# Patient Record
Sex: FEMALE | Race: WHITE | Employment: FULL TIME | URBAN - METROPOLITAN AREA
[De-identification: names, ages, dates, MRNs, and addresses within clinical notes are randomized per-mention and may not be internally consistent; named-entity substitution may affect disease eponyms.]

---

## 2017-06-05 ENCOUNTER — APPOINTMENT (OUTPATIENT)
Dept: GENERAL RADIOLOGY | Facility: HOSPITAL | Age: 37
End: 2017-06-05
Payer: MEDICAID

## 2017-06-05 ENCOUNTER — HOSPITAL ENCOUNTER (EMERGENCY)
Facility: HOSPITAL | Age: 37
Discharge: HOME | End: 2017-06-05
Payer: MEDICAID

## 2017-06-05 VITALS
WEIGHT: 222 LBS | TEMPERATURE: 99 F | HEIGHT: 65 IN | HEART RATE: 88 BPM | OXYGEN SATURATION: 99 % | SYSTOLIC BLOOD PRESSURE: 126 MMHG | RESPIRATION RATE: 16 BRPM | DIASTOLIC BLOOD PRESSURE: 78 MMHG | BODY MASS INDEX: 36.99 KG/M2

## 2017-06-05 DIAGNOSIS — J45.901 ASTHMA EXACERBATION, MILD: ICD-10-CM

## 2017-06-05 DIAGNOSIS — J06.9 VIRAL UPPER RESPIRATORY TRACT INFECTION: Primary | ICD-10-CM

## 2017-06-05 PROCEDURE — 99284 EMERGENCY DEPT VISIT MOD MDM: CPT

## 2017-06-05 PROCEDURE — 71020 XR CHEST PA + LAT CHEST (CPT=71020): CPT

## 2017-06-05 PROCEDURE — 94640 AIRWAY INHALATION TREATMENT: CPT

## 2017-06-05 PROCEDURE — 99283 EMERGENCY DEPT VISIT LOW MDM: CPT

## 2017-06-05 RX ORDER — IPRATROPIUM BROMIDE AND ALBUTEROL SULFATE 2.5; .5 MG/3ML; MG/3ML
3 SOLUTION RESPIRATORY (INHALATION) ONCE
Status: COMPLETED | OUTPATIENT
Start: 2017-06-05 | End: 2017-06-05

## 2017-06-05 RX ORDER — CODEINE PHOSPHATE AND GUAIFENESIN 10; 100 MG/5ML; MG/5ML
5 SOLUTION ORAL EVERY 6 HOURS PRN
Qty: 120 ML | Refills: 0 | Status: SHIPPED | OUTPATIENT
Start: 2017-06-05 | End: 2017-09-01 | Stop reason: ALTCHOICE

## 2017-06-05 RX ORDER — ALBUTEROL SULFATE 2.5 MG/3ML
2.5 SOLUTION RESPIRATORY (INHALATION) EVERY 4 HOURS PRN
Qty: 25 AMPULE | Refills: 0 | Status: SHIPPED | OUTPATIENT
Start: 2017-06-05 | End: 2018-08-21 | Stop reason: ALTCHOICE

## 2017-06-05 RX ORDER — ALBUTEROL SULFATE 90 UG/1
2 AEROSOL, METERED RESPIRATORY (INHALATION) EVERY 4 HOURS PRN
Qty: 1 INHALER | Refills: 0 | Status: SHIPPED | OUTPATIENT
Start: 2017-06-05 | End: 2017-07-05

## 2017-06-05 NOTE — ED PROVIDER NOTES
Patient Seen in: [facility] Emergency Department    History   Patient presents with:  Cough/URI    Stated Complaint: cough    HPI    44-year-old female complaining of coughing the patient's states she has had coughing last 2 3 days has been a dry co Types: Cigarettes    Smokeless Status: Never Used                        Alcohol Use: No                 Comment: socially      Review of Systems    Positive for stated complaint: cough  Other systems are as noted in HPI.   Constitutional and vital 6 (six) hours as needed for cough., Normal, Disp-120 mL, R-0    albuterol sulfate (2.5 MG/3ML) 0.083% Inhalation Nebu Soln  Take 3 mL (2.5 mg total) by nebulization every 4 (four) hours as needed for Wheezing., Normal, Disp-25 ampule, R-0    !!  Albuterol S

## 2017-06-05 NOTE — ED INITIAL ASSESSMENT (HPI)
Pt sts that she has had RUSSELL since last noc with green productive cough x 2 days. Pt sts that she has pain in chest when she coughs. Pt sts \"it feels like the last time I had pneumonia\".  Pt has rash over chest and shoulders and sts that \"I'm allergic to

## 2017-07-08 ENCOUNTER — APPOINTMENT (OUTPATIENT)
Dept: GENERAL RADIOLOGY | Facility: HOSPITAL | Age: 37
End: 2017-07-08
Attending: EMERGENCY MEDICINE
Payer: MEDICAID

## 2017-07-08 ENCOUNTER — HOSPITAL ENCOUNTER (EMERGENCY)
Facility: HOSPITAL | Age: 37
Discharge: HOME | End: 2017-07-08
Attending: EMERGENCY MEDICINE
Payer: MEDICAID

## 2017-07-08 VITALS
RESPIRATION RATE: 16 BRPM | HEART RATE: 79 BPM | OXYGEN SATURATION: 100 % | BODY MASS INDEX: 35.16 KG/M2 | WEIGHT: 211 LBS | SYSTOLIC BLOOD PRESSURE: 147 MMHG | TEMPERATURE: 98 F | DIASTOLIC BLOOD PRESSURE: 106 MMHG | HEIGHT: 65 IN

## 2017-07-08 DIAGNOSIS — L03.116 CELLULITIS OF LEFT LOWER EXTREMITY: Primary | ICD-10-CM

## 2017-07-08 PROCEDURE — 73562 X-RAY EXAM OF KNEE 3: CPT | Performed by: EMERGENCY MEDICINE

## 2017-07-08 PROCEDURE — 99283 EMERGENCY DEPT VISIT LOW MDM: CPT

## 2017-07-08 RX ORDER — CLINDAMYCIN HYDROCHLORIDE 300 MG/1
300 CAPSULE ORAL 3 TIMES DAILY
Qty: 30 CAPSULE | Refills: 0 | Status: SHIPPED | OUTPATIENT
Start: 2017-07-08 | End: 2017-07-18

## 2017-07-09 NOTE — ED PROVIDER NOTES
Patient Seen in: [facility] Emergency Department    History   Patient presents with:  Lower Extremity Injury (musculoskeletal)    Stated Complaint: lt knee pain no trauma    HPI    73-year-old female presents for evaluation of left knee pain.   Iron Years: 0.00         Types: Cigarettes  Smokeless tobacco: Never Used                      Alcohol use: No               Comment: socially      Review of Systems    Positive for stated complaint: lt knee pain no trauma  Other systems are as noted in HPI.   C [id number]  [phone]    Call in 2 days  For wound re-check      Medications Prescribed:  Current Discharge Medication List    START taking these medications    Clindamycin HCl 300 MG Oral Cap  Take 1 capsule (300 mg total) by mouth 3 (three) times praveen

## 2017-08-29 ENCOUNTER — HOSPITAL ENCOUNTER (EMERGENCY)
Facility: HOSPITAL | Age: 37
Discharge: HOME | End: 2017-08-29
Attending: EMERGENCY MEDICINE
Payer: MEDICAID

## 2017-08-29 ENCOUNTER — APPOINTMENT (OUTPATIENT)
Dept: CT IMAGING | Facility: HOSPITAL | Age: 37
End: 2017-08-29
Attending: EMERGENCY MEDICINE
Payer: MEDICAID

## 2017-08-29 VITALS
RESPIRATION RATE: 18 BRPM | DIASTOLIC BLOOD PRESSURE: 72 MMHG | HEART RATE: 62 BPM | WEIGHT: 215 LBS | TEMPERATURE: 97 F | OXYGEN SATURATION: 99 % | HEIGHT: 65 IN | SYSTOLIC BLOOD PRESSURE: 138 MMHG | BODY MASS INDEX: 35.82 KG/M2

## 2017-08-29 DIAGNOSIS — R10.9 ACUTE RIGHT FLANK PAIN: Primary | ICD-10-CM

## 2017-08-29 LAB
ALBUMIN SERPL-MCNC: 3.9 G/DL (ref 3.5–4.8)
ALP LIVER SERPL-CCNC: 67 U/L (ref 37–98)
ALT SERPL-CCNC: 32 U/L (ref 14–54)
AST SERPL-CCNC: 13 U/L (ref 15–41)
BASOPHILS # BLD AUTO: 0.03 X10(3) UL (ref 0–0.1)
BASOPHILS NFR BLD AUTO: 0.4 %
BILIRUB SERPL-MCNC: 0.2 MG/DL (ref 0.1–2)
BILIRUB UR QL STRIP.AUTO: NEGATIVE
BUN BLD-MCNC: 7 MG/DL (ref 8–20)
CALCIUM BLD-MCNC: 9.2 MG/DL (ref 8.3–10.3)
CHLORIDE: 106 MMOL/L (ref 101–111)
CLARITY UR REFRACT.AUTO: CLEAR
CO2: 26 MMOL/L (ref 22–32)
COLOR UR AUTO: YELLOW
CREAT BLD-MCNC: 0.65 MG/DL (ref 0.55–1.02)
EOSINOPHIL # BLD AUTO: 0.17 X10(3) UL (ref 0–0.3)
EOSINOPHIL NFR BLD AUTO: 2 %
ERYTHROCYTE [DISTWIDTH] IN BLOOD BY AUTOMATED COUNT: 12.5 % (ref 11.5–16)
GLUCOSE BLD-MCNC: 78 MG/DL (ref 70–99)
GLUCOSE UR STRIP.AUTO-MCNC: NEGATIVE MG/DL
HCT VFR BLD AUTO: 40 % (ref 34–50)
HGB BLD-MCNC: 13.7 G/DL (ref 12–16)
IMMATURE GRANULOCYTE COUNT: 0.04 X10(3) UL (ref 0–1)
IMMATURE GRANULOCYTE RATIO %: 0.5 %
KETONES UR STRIP.AUTO-MCNC: NEGATIVE MG/DL
LIPASE: 104 U/L (ref 73–393)
LYMPHOCYTES # BLD AUTO: 3.14 X10(3) UL (ref 0.9–4)
LYMPHOCYTES NFR BLD AUTO: 37.5 %
M PROTEIN MFR SERPL ELPH: 7.1 G/DL (ref 6.1–8.3)
MCH RBC QN AUTO: 29.6 PG (ref 27–33.2)
MCHC RBC AUTO-ENTMCNC: 34.3 G/DL (ref 31–37)
MCV RBC AUTO: 86.4 FL (ref 81–100)
MONOCYTES # BLD AUTO: 0.58 X10(3) UL (ref 0.1–0.6)
MONOCYTES NFR BLD AUTO: 6.9 %
NEUTROPHIL ABS PRELIM: 4.41 X10 (3) UL (ref 1.3–6.7)
NEUTROPHILS # BLD AUTO: 4.41 X10(3) UL (ref 1.3–6.7)
NEUTROPHILS NFR BLD AUTO: 52.7 %
NITRITE UR QL STRIP.AUTO: NEGATIVE
PH UR STRIP.AUTO: 6 [PH] (ref 4.5–8)
PLATELET # BLD AUTO: 320 10(3)UL (ref 150–450)
POCT URINE PREGNANCY: NEGATIVE
POTASSIUM SERPL-SCNC: 3.9 MMOL/L (ref 3.6–5.1)
PROCEDURE CONTROL: YES
PROT UR STRIP.AUTO-MCNC: NEGATIVE MG/DL
RBC # BLD AUTO: 4.63 X10(6)UL (ref 3.8–5.1)
RBC UR QL AUTO: NEGATIVE
RED CELL DISTRIBUTION WIDTH-SD: 39 FL (ref 35.1–46.3)
SODIUM SERPL-SCNC: 138 MMOL/L (ref 136–144)
SP GR UR STRIP.AUTO: 1.01 (ref 1–1.03)
UROBILINOGEN UR STRIP.AUTO-MCNC: <2 MG/DL
WBC # BLD AUTO: 8.4 X10(3) UL (ref 4–13)

## 2017-08-29 PROCEDURE — 74176 CT ABD & PELVIS W/O CONTRAST: CPT | Performed by: EMERGENCY MEDICINE

## 2017-08-29 PROCEDURE — 96361 HYDRATE IV INFUSION ADD-ON: CPT

## 2017-08-29 PROCEDURE — 80053 COMPREHEN METABOLIC PANEL: CPT | Performed by: EMERGENCY MEDICINE

## 2017-08-29 PROCEDURE — 96375 TX/PRO/DX INJ NEW DRUG ADDON: CPT

## 2017-08-29 PROCEDURE — 83690 ASSAY OF LIPASE: CPT | Performed by: EMERGENCY MEDICINE

## 2017-08-29 PROCEDURE — 96374 THER/PROPH/DIAG INJ IV PUSH: CPT

## 2017-08-29 PROCEDURE — 87086 URINE CULTURE/COLONY COUNT: CPT | Performed by: EMERGENCY MEDICINE

## 2017-08-29 PROCEDURE — 99284 EMERGENCY DEPT VISIT MOD MDM: CPT

## 2017-08-29 PROCEDURE — 85025 COMPLETE CBC W/AUTO DIFF WBC: CPT | Performed by: EMERGENCY MEDICINE

## 2017-08-29 PROCEDURE — 81001 URINALYSIS AUTO W/SCOPE: CPT | Performed by: EMERGENCY MEDICINE

## 2017-08-29 PROCEDURE — 81025 URINE PREGNANCY TEST: CPT

## 2017-08-29 RX ORDER — SULFAMETHOXAZOLE AND TRIMETHOPRIM 800; 160 MG/1; MG/1
1 TABLET ORAL 2 TIMES DAILY
Qty: 20 TABLET | Refills: 0 | Status: SHIPPED | OUTPATIENT
Start: 2017-08-29 | End: 2017-09-08

## 2017-08-29 RX ORDER — ONDANSETRON 2 MG/ML
4 INJECTION INTRAMUSCULAR; INTRAVENOUS ONCE
Status: COMPLETED | OUTPATIENT
Start: 2017-08-29 | End: 2017-08-29

## 2017-08-29 RX ORDER — HYDROCODONE BITARTRATE AND ACETAMINOPHEN 5; 325 MG/1; MG/1
1-2 TABLET ORAL EVERY 4 HOURS PRN
Qty: 20 TABLET | Refills: 0 | Status: SHIPPED | OUTPATIENT
Start: 2017-08-29 | End: 2017-09-05

## 2017-08-29 RX ORDER — ONDANSETRON 4 MG/1
4 TABLET, ORALLY DISINTEGRATING ORAL EVERY 4 HOURS PRN
Qty: 10 TABLET | Refills: 0 | Status: SHIPPED | OUTPATIENT
Start: 2017-08-29 | End: 2017-09-05

## 2017-08-29 RX ORDER — KETOROLAC TROMETHAMINE 30 MG/ML
30 INJECTION, SOLUTION INTRAMUSCULAR; INTRAVENOUS ONCE
Status: COMPLETED | OUTPATIENT
Start: 2017-08-29 | End: 2017-08-29

## 2017-08-29 RX ORDER — HYDROMORPHONE HYDROCHLORIDE 1 MG/ML
0.5 INJECTION, SOLUTION INTRAMUSCULAR; INTRAVENOUS; SUBCUTANEOUS EVERY 30 MIN PRN
Status: DISCONTINUED | OUTPATIENT
Start: 2017-08-29 | End: 2017-08-30

## 2017-08-30 NOTE — ED PROVIDER NOTES
Patient Seen in: [facility] Emergency Department    History   Patient presents with:  Urinary Symptoms (urologic)  Abdomen/Flank Pain (GI/)    Stated Complaint: UTI SX AND FLANK PAIN    HPI    Patient is a 80-year-old female who states that she wo Albuterol Sulfate  (90 BASE) MCG/ACT Inhalation Aero Soln,  Inhale into the lungs every 6 (six) hours as needed for Wheezing. ibuprofen (MOTRIN) 800 MG Oral Tab,  Take 800 mg by mouth every 6 (six) hours as needed for Pain.    ClonazePAM (KLONOPIN) sounds, soft, mild tenderness right flank, nondistended. No rebound, no guarding, no hepatosplenomegaly. EXTREMITIES: Full range of motion, no tenderness, good capillary refill. SKIN: No rash, good turgor.   NEURO: Patient answers questions appropriately with Bactrim. Patient will comfortable going home. Patient was given pain medicines for home. Recommend plenty of fluids. Follow-up for further evaluation return if new or worse symptoms.         Disposition and Plan     Clinical Impression:  Acute righ

## 2017-08-30 NOTE — ED INITIAL ASSESSMENT (HPI)
Bilateral flank pain with radiation to bilateral groin x1 day.  Pt reports frequency of urination, denies burning

## 2017-09-01 ENCOUNTER — HOSPITAL ENCOUNTER (EMERGENCY)
Facility: HOSPITAL | Age: 37
Discharge: HOME | End: 2017-09-01
Attending: EMERGENCY MEDICINE
Payer: MEDICAID

## 2017-09-01 ENCOUNTER — APPOINTMENT (OUTPATIENT)
Dept: ULTRASOUND IMAGING | Facility: HOSPITAL | Age: 37
End: 2017-09-01
Attending: EMERGENCY MEDICINE
Payer: MEDICAID

## 2017-09-01 VITALS
WEIGHT: 204 LBS | DIASTOLIC BLOOD PRESSURE: 58 MMHG | HEART RATE: 60 BPM | BODY MASS INDEX: 33.99 KG/M2 | TEMPERATURE: 98 F | HEIGHT: 65 IN | RESPIRATION RATE: 16 BRPM | SYSTOLIC BLOOD PRESSURE: 114 MMHG | OXYGEN SATURATION: 100 %

## 2017-09-01 DIAGNOSIS — R10.9 ABDOMINAL PAIN OF UNKNOWN ETIOLOGY: Primary | ICD-10-CM

## 2017-09-01 LAB
ALBUMIN SERPL-MCNC: 4 G/DL (ref 3.5–4.8)
ALP LIVER SERPL-CCNC: 61 U/L (ref 37–98)
ALT SERPL-CCNC: 31 U/L (ref 14–54)
AST SERPL-CCNC: 16 U/L (ref 15–41)
BASOPHILS # BLD AUTO: 0.02 X10(3) UL (ref 0–0.1)
BASOPHILS NFR BLD AUTO: 0.5 %
BILIRUB SERPL-MCNC: 0.4 MG/DL (ref 0.1–2)
BILIRUB UR QL STRIP.AUTO: NEGATIVE
BUN BLD-MCNC: 8 MG/DL (ref 8–20)
CALCIUM BLD-MCNC: 9.3 MG/DL (ref 8.3–10.3)
CHLORIDE: 108 MMOL/L (ref 101–111)
CO2: 21 MMOL/L (ref 22–32)
COLOR UR AUTO: YELLOW
CREAT BLD-MCNC: 0.72 MG/DL (ref 0.55–1.02)
EOSINOPHIL # BLD AUTO: 0.11 X10(3) UL (ref 0–0.3)
EOSINOPHIL NFR BLD AUTO: 2.6 %
ERYTHROCYTE [DISTWIDTH] IN BLOOD BY AUTOMATED COUNT: 12.3 % (ref 11.5–16)
GLUCOSE BLD-MCNC: 80 MG/DL (ref 70–99)
GLUCOSE UR STRIP.AUTO-MCNC: NEGATIVE MG/DL
HCT VFR BLD AUTO: 41.4 % (ref 34–50)
HGB BLD-MCNC: 14.1 G/DL (ref 12–16)
IMMATURE GRANULOCYTE COUNT: 0.02 X10(3) UL (ref 0–1)
IMMATURE GRANULOCYTE RATIO %: 0.5 %
KETONES UR STRIP.AUTO-MCNC: 20 MG/DL
LIPASE: 64 U/L (ref 73–393)
LYMPHOCYTES # BLD AUTO: 0.58 X10(3) UL (ref 0.9–4)
LYMPHOCYTES NFR BLD AUTO: 13.5 %
M PROTEIN MFR SERPL ELPH: 7.4 G/DL (ref 6.1–8.3)
MCH RBC QN AUTO: 29.2 PG (ref 27–33.2)
MCHC RBC AUTO-ENTMCNC: 34.1 G/DL (ref 31–37)
MCV RBC AUTO: 85.7 FL (ref 81–100)
MONOCYTES # BLD AUTO: 0.42 X10(3) UL (ref 0.1–0.6)
MONOCYTES NFR BLD AUTO: 9.8 %
NEUTROPHIL ABS PRELIM: 3.14 X10 (3) UL (ref 1.3–6.7)
NEUTROPHILS # BLD AUTO: 3.14 X10(3) UL (ref 1.3–6.7)
NEUTROPHILS NFR BLD AUTO: 73.1 %
NITRITE UR QL STRIP.AUTO: NEGATIVE
PH UR STRIP.AUTO: 6 [PH] (ref 4.5–8)
PLATELET # BLD AUTO: 269 10(3)UL (ref 150–450)
POTASSIUM SERPL-SCNC: 4 MMOL/L (ref 3.6–5.1)
PROT UR STRIP.AUTO-MCNC: NEGATIVE MG/DL
RBC # BLD AUTO: 4.83 X10(6)UL (ref 3.8–5.1)
RBC UR QL AUTO: NEGATIVE
RED CELL DISTRIBUTION WIDTH-SD: 38.5 FL (ref 35.1–46.3)
SODIUM SERPL-SCNC: 139 MMOL/L (ref 136–144)
SP GR UR STRIP.AUTO: 1.02 (ref 1–1.03)
UROBILINOGEN UR STRIP.AUTO-MCNC: 2 MG/DL
WBC # BLD AUTO: 4.3 X10(3) UL (ref 4–13)

## 2017-09-01 PROCEDURE — 76856 US EXAM PELVIC COMPLETE: CPT | Performed by: EMERGENCY MEDICINE

## 2017-09-01 PROCEDURE — 99284 EMERGENCY DEPT VISIT MOD MDM: CPT

## 2017-09-01 PROCEDURE — 80053 COMPREHEN METABOLIC PANEL: CPT | Performed by: EMERGENCY MEDICINE

## 2017-09-01 PROCEDURE — 81001 URINALYSIS AUTO W/SCOPE: CPT | Performed by: EMERGENCY MEDICINE

## 2017-09-01 PROCEDURE — 93975 VASCULAR STUDY: CPT | Performed by: EMERGENCY MEDICINE

## 2017-09-01 PROCEDURE — 96374 THER/PROPH/DIAG INJ IV PUSH: CPT

## 2017-09-01 PROCEDURE — 83690 ASSAY OF LIPASE: CPT | Performed by: EMERGENCY MEDICINE

## 2017-09-01 PROCEDURE — 96361 HYDRATE IV INFUSION ADD-ON: CPT

## 2017-09-01 PROCEDURE — 96375 TX/PRO/DX INJ NEW DRUG ADDON: CPT

## 2017-09-01 PROCEDURE — 85025 COMPLETE CBC W/AUTO DIFF WBC: CPT | Performed by: EMERGENCY MEDICINE

## 2017-09-01 PROCEDURE — 76830 TRANSVAGINAL US NON-OB: CPT | Performed by: EMERGENCY MEDICINE

## 2017-09-01 RX ORDER — HYDROCODONE BITARTRATE AND ACETAMINOPHEN 5; 325 MG/1; MG/1
1 TABLET ORAL EVERY 4 HOURS PRN
Qty: 10 TABLET | Refills: 0 | Status: SHIPPED | OUTPATIENT
Start: 2017-09-01 | End: 2017-09-03

## 2017-09-01 RX ORDER — DIPHENHYDRAMINE HYDROCHLORIDE 50 MG/ML
25 INJECTION INTRAMUSCULAR; INTRAVENOUS ONCE
Status: COMPLETED | OUTPATIENT
Start: 2017-09-01 | End: 2017-09-01

## 2017-09-01 RX ORDER — METOCLOPRAMIDE HYDROCHLORIDE 5 MG/ML
10 INJECTION INTRAMUSCULAR; INTRAVENOUS ONCE
Status: COMPLETED | OUTPATIENT
Start: 2017-09-01 | End: 2017-09-01

## 2017-09-01 RX ORDER — KETOROLAC TROMETHAMINE 30 MG/ML
30 INJECTION, SOLUTION INTRAMUSCULAR; INTRAVENOUS ONCE
Status: COMPLETED | OUTPATIENT
Start: 2017-09-01 | End: 2017-09-01

## 2017-09-01 NOTE — ED NOTES
Pt returned from [place]. Pt reports pain increasing in head and R flank. Denies nausea. IVF complete. VSS.  A&A, will continue to monitor

## 2017-09-01 NOTE — ED NOTES
Rounding complete. Pt reports improvement in pain after medication. Pt reports head ache 4/10 and flank pain a 6/10. IVF running. Warm blankets provided, call light in reach. Family remains at bedside.  Will continue to monitor

## 2017-09-01 NOTE — ED INITIAL ASSESSMENT (HPI)
Pt arrives with c/o head ache and flank pain. Pt was seen here Tuesday with kidney infection and sent home on antibiotics. Pt denies any relief of pain or improvement with antibiotics. Pt also reports HA with chills. Denies fevers. +Nausea/ vomiting.  Pt de

## 2017-09-01 NOTE — ED NOTES
Patient being discharged home  Patient demonstrates improvement upon d/c. Pt verbalized understanding of d/c instructions. AVS provided.    Home with mother

## 2017-09-01 NOTE — ED PROVIDER NOTES
Patient Seen in: [facility] Emergency Department    History   Patient presents with:  Headache (neurologic)  Abdomen/Flank Pain (GI/)    Stated Complaint: headache, flank pain,     HPI    52-year-old female complaining of abdominal pain the patien Sulfamethoxazole-TMP -160 MG Oral Tab per tablet,  Take 1 tablet by mouth 2 (two) times daily. albuterol sulfate (2.5 MG/3ML) 0.083% Inhalation Nebu Soln,  Take 3 mL (2.5 mg total) by nebulization every 4 (four) hours as needed for Wheezing.    Albu auscultation. Cardiovascular exam regular rate and rhythm without murmurs. Abdomen is soft and moderate right lower quadrant tenderness there is mild left lower quadrant tenderness and mild right flank tenderness there is no rebound or guarding.   Without GOLD   RAINBOW DRAW LAVENDER   RAINBOW DRAW LIGHT GREEN       ============================================================  ED Course  ------------------------------------------------------------  MDM   IV was started labs are drawn the patient's labs were

## 2018-02-22 ENCOUNTER — HOSPITAL ENCOUNTER (EMERGENCY)
Age: 38
Discharge: HOME | End: 2018-02-22
Attending: EMERGENCY MEDICINE
Payer: MEDICAID

## 2018-02-22 ENCOUNTER — APPOINTMENT (OUTPATIENT)
Dept: GENERAL RADIOLOGY | Age: 38
End: 2018-02-22
Attending: EMERGENCY MEDICINE
Payer: MEDICAID

## 2018-02-22 VITALS
SYSTOLIC BLOOD PRESSURE: 113 MMHG | HEIGHT: 65 IN | TEMPERATURE: 98 F | RESPIRATION RATE: 19 BRPM | OXYGEN SATURATION: 99 % | HEART RATE: 78 BPM | WEIGHT: 195 LBS | BODY MASS INDEX: 32.49 KG/M2 | DIASTOLIC BLOOD PRESSURE: 58 MMHG

## 2018-02-22 DIAGNOSIS — J45.21 MILD INTERMITTENT ASTHMA WITH EXACERBATION: ICD-10-CM

## 2018-02-22 DIAGNOSIS — J11.1 INFLUENZA: Primary | ICD-10-CM

## 2018-02-22 LAB
ATRIAL RATE: 91 BPM
P AXIS: 59 DEGREES
P-R INTERVAL: 160 MS
Q-T INTERVAL: 384 MS
QRS DURATION: 88 MS
QTC CALCULATION (BEZET): 472 MS
R AXIS: 77 DEGREES
T AXIS: 63 DEGREES
VENTRICULAR RATE: 91 BPM

## 2018-02-22 PROCEDURE — 99284 EMERGENCY DEPT VISIT MOD MDM: CPT

## 2018-02-22 PROCEDURE — 94640 AIRWAY INHALATION TREATMENT: CPT

## 2018-02-22 PROCEDURE — 93005 ELECTROCARDIOGRAM TRACING: CPT

## 2018-02-22 PROCEDURE — 94644 CONT INHLJ TX 1ST HOUR: CPT

## 2018-02-22 PROCEDURE — 71046 X-RAY EXAM CHEST 2 VIEWS: CPT | Performed by: EMERGENCY MEDICINE

## 2018-02-22 PROCEDURE — 93010 ELECTROCARDIOGRAM REPORT: CPT

## 2018-02-22 PROCEDURE — 99285 EMERGENCY DEPT VISIT HI MDM: CPT

## 2018-02-22 RX ORDER — ALBUTEROL SULFATE 90 UG/1
2 AEROSOL, METERED RESPIRATORY (INHALATION) EVERY 6 HOURS PRN
Qty: 1 INHALER | Refills: 0 | Status: SHIPPED | OUTPATIENT
Start: 2018-02-22 | End: 2018-08-21 | Stop reason: ALTCHOICE

## 2018-02-22 RX ORDER — PREDNISONE 20 MG/1
60 TABLET ORAL ONCE
Status: COMPLETED | OUTPATIENT
Start: 2018-02-22 | End: 2018-02-22

## 2018-02-22 RX ORDER — ALBUTEROL SULFATE 2.5 MG/3ML
2.5 SOLUTION RESPIRATORY (INHALATION) EVERY 4 HOURS PRN
Qty: 30 AMPULE | Refills: 12 | Status: SHIPPED | OUTPATIENT
Start: 2018-02-22 | End: 2018-08-21 | Stop reason: ALTCHOICE

## 2018-02-22 RX ORDER — PREDNISONE 20 MG/1
60 TABLET ORAL DAILY
Qty: 15 TABLET | Refills: 0 | Status: SHIPPED | OUTPATIENT
Start: 2018-02-22 | End: 2018-02-27

## 2018-02-22 RX ORDER — IPRATROPIUM BROMIDE AND ALBUTEROL SULFATE 2.5; .5 MG/3ML; MG/3ML
3 SOLUTION RESPIRATORY (INHALATION) ONCE
Status: COMPLETED | OUTPATIENT
Start: 2018-02-22 | End: 2018-02-22

## 2018-02-22 RX ORDER — OSELTAMIVIR PHOSPHATE 75 MG/1
75 CAPSULE ORAL 2 TIMES DAILY
Qty: 10 CAPSULE | Refills: 0 | Status: SHIPPED | OUTPATIENT
Start: 2018-02-22 | End: 2018-02-27

## 2018-02-22 NOTE — ED PROVIDER NOTES
Patient Seen in: [address] [facility] In [place]    History   Patient presents with:  Chest Pain Angina (cardiovascular)    Stated Complaint:     HPI    Patient is a 60-year-old female presents emergency room for evaluation of chest tightness a BMI 32.45 kg/m²         Physical Exam    GENERAL: No acute distress, well appearing and non-toxic, Alert and oriented X 3   HEENT: Normocephalic, atraumatic. Moist mucous membranes.   Pupils equal round reactive to light accommodation, extraocular motion i [id number]  ------------------------------------------------------------       MDM     Patient is a 78-year-old female comes emergency room for evaluation of chest tightness. Patient given DuoNeb here and prednisone.   Lung sounds reexamined with improved aerati (2.5 mg total) by nebulization every 4 (four) hours as needed for Wheezing. Qty: 30 ampule Refills: 12    predniSONE 20 MG Oral Tab  Take 3 tablets (60 mg total) by mouth daily.   Qty: 15 tablet Refills: 0    Oseltamivir Phosphate (TAMIFLU) 75 MG Oral Cap

## 2018-08-21 PROCEDURE — 88175 CYTOPATH C/V AUTO FLUID REDO: CPT | Performed by: OBSTETRICS & GYNECOLOGY

## 2018-08-21 PROCEDURE — 87624 HPV HI-RISK TYP POOLED RSLT: CPT | Performed by: OBSTETRICS & GYNECOLOGY

## 2018-08-29 ENCOUNTER — HOSPITAL ENCOUNTER (EMERGENCY)
Age: 38
Discharge: HOME | End: 2018-08-29
Attending: EMERGENCY MEDICINE
Payer: MEDICAID

## 2018-08-29 ENCOUNTER — APPOINTMENT (OUTPATIENT)
Dept: GENERAL RADIOLOGY | Age: 38
End: 2018-08-29
Attending: EMERGENCY MEDICINE
Payer: MEDICAID

## 2018-08-29 VITALS
SYSTOLIC BLOOD PRESSURE: 115 MMHG | WEIGHT: 200 LBS | OXYGEN SATURATION: 100 % | HEIGHT: 65 IN | TEMPERATURE: 99 F | BODY MASS INDEX: 33.32 KG/M2 | RESPIRATION RATE: 18 BRPM | DIASTOLIC BLOOD PRESSURE: 77 MMHG | HEART RATE: 79 BPM

## 2018-08-29 DIAGNOSIS — J40 BRONCHITIS: Primary | ICD-10-CM

## 2018-08-29 PROCEDURE — 99283 EMERGENCY DEPT VISIT LOW MDM: CPT

## 2018-08-29 PROCEDURE — 71046 X-RAY EXAM CHEST 2 VIEWS: CPT | Performed by: EMERGENCY MEDICINE

## 2018-08-29 RX ORDER — CLARITHROMYCIN 500 MG/1
500 TABLET, COATED ORAL 2 TIMES DAILY
Qty: 20 TABLET | Refills: 0 | Status: SHIPPED | OUTPATIENT
Start: 2018-08-29 | End: 2018-09-08

## 2018-08-29 RX ORDER — ALBUTEROL SULFATE 90 UG/1
2 AEROSOL, METERED RESPIRATORY (INHALATION) EVERY 6 HOURS PRN
COMMUNITY
End: 2020-03-04

## 2018-08-29 NOTE — ED PROVIDER NOTES
Patient Seen in: THE [facility] Emergency Department In [place]    History   Patient presents with:  Cough/URI  Dyspnea RUSSELL SOB (respiratory)    Stated Complaint: cough, sore throat, short of breath onset two days, fever    HPI    The patient is 27-year-old f air)    Current:/77   Pulse 79   Temp 98.5 °F (36.9 °C) (Temporal)   Resp 18   Ht 165.1 cm (5' 5\")   Wt 90.7 kg   LMP 08/18/2018   SpO2 100%   BMI 33.28 kg/m²         Physical Exam  GENERAL: Well-developed, well-nourished female sitting up breathing Patient will be treated with antibiotics at home and instructions to stop smoking. Patient instructed to encourage fluids and rest at home. Patient has been instructed to return to ER immediately if symptoms worsen or if any other problems arise.   Esmer

## 2018-08-29 NOTE — ED INITIAL ASSESSMENT (HPI)
States two day hx of cough, sore throat and thoracic back pain, worse with cough also feel short of breath and fatigued

## 2018-09-14 PROCEDURE — 88305 TISSUE EXAM BY PATHOLOGIST: CPT | Performed by: OBSTETRICS & GYNECOLOGY

## 2018-10-09 PROCEDURE — 88305 TISSUE EXAM BY PATHOLOGIST: CPT | Performed by: OBSTETRICS & GYNECOLOGY

## 2019-02-02 PROCEDURE — 99283 EMERGENCY DEPT VISIT LOW MDM: CPT

## 2019-02-02 PROCEDURE — 99284 EMERGENCY DEPT VISIT MOD MDM: CPT

## 2019-02-03 ENCOUNTER — HOSPITAL ENCOUNTER (EMERGENCY)
Facility: HOSPITAL | Age: 39
Discharge: HOME | End: 2019-02-03
Attending: EMERGENCY MEDICINE

## 2019-02-03 VITALS
HEART RATE: 67 BPM | WEIGHT: 210 LBS | TEMPERATURE: 97 F | DIASTOLIC BLOOD PRESSURE: 92 MMHG | OXYGEN SATURATION: 98 % | HEIGHT: 65 IN | BODY MASS INDEX: 34.99 KG/M2 | RESPIRATION RATE: 16 BRPM | SYSTOLIC BLOOD PRESSURE: 133 MMHG

## 2019-02-03 DIAGNOSIS — J40 COMPLICATED BRONCHITIS: Primary | ICD-10-CM

## 2019-02-03 PROCEDURE — 94640 AIRWAY INHALATION TREATMENT: CPT

## 2019-02-03 RX ORDER — ALBUTEROL SULFATE 90 UG/1
2 AEROSOL, METERED RESPIRATORY (INHALATION) EVERY 4 HOURS PRN
Qty: 1 INHALER | Refills: 0 | Status: SHIPPED | OUTPATIENT
Start: 2019-02-03 | End: 2019-03-05

## 2019-02-03 RX ORDER — PREDNISONE 20 MG/1
40 TABLET ORAL DAILY
Qty: 10 TABLET | Refills: 0 | Status: SHIPPED | OUTPATIENT
Start: 2019-02-03 | End: 2019-02-08

## 2019-02-03 RX ORDER — IPRATROPIUM BROMIDE AND ALBUTEROL SULFATE 2.5; .5 MG/3ML; MG/3ML
3 SOLUTION RESPIRATORY (INHALATION) ONCE
Status: COMPLETED | OUTPATIENT
Start: 2019-02-03 | End: 2019-02-03

## 2019-02-03 RX ORDER — AZITHROMYCIN 250 MG/1
500 TABLET, FILM COATED ORAL ONCE
Status: COMPLETED | OUTPATIENT
Start: 2019-02-03 | End: 2019-02-03

## 2019-02-03 RX ORDER — GUAIFENESIN 600 MG
1200 TABLET, EXTENDED RELEASE 12 HR ORAL 2 TIMES DAILY
Qty: 20 TABLET | Refills: 0 | Status: SHIPPED | OUTPATIENT
Start: 2019-02-03 | End: 2020-02-06 | Stop reason: ALTCHOICE

## 2019-02-03 NOTE — ED PROVIDER NOTES
Patient Seen in: [facility] Emergency Department    History   Patient presents with:  Dyspnea RUSSELL SOB (respiratory)  Cough/URI    Stated Complaint: RUSSELL    HPI     40 yo fm with asthma now presents to the ER with c/o cough and wheezing.  She is also c person, place, and time. She appears well-developed and well-nourished. HENT:   Head: Normocephalic and atraumatic. Eyes: EOM are normal. Pupils are equal, round, and reactive to light. Neck: Normal range of motion. Neck supple.    Cardiovascular: Nor tablets (1,200 mg total) by mouth 2 (two) times daily. , Normal, Disp-20 tablet, R-0    !! - Potential duplicate medications found. Please discuss with provider.

## 2019-02-03 NOTE — ED INITIAL ASSESSMENT (HPI)
Patient complaining of generalized body aches, congestion and cough x one week. States she started using her albuterol inhaler more often and ran out 2 days ago. States her kids had sinus infections for the past couple weeks.

## 2019-10-05 ENCOUNTER — APPOINTMENT (OUTPATIENT)
Dept: GENERAL RADIOLOGY | Facility: HOSPITAL | Age: 39
End: 2019-10-05
Attending: EMERGENCY MEDICINE
Payer: MEDICAID

## 2019-10-05 ENCOUNTER — HOSPITAL ENCOUNTER (EMERGENCY)
Facility: HOSPITAL | Age: 39
Discharge: HOME | End: 2019-10-05
Attending: EMERGENCY MEDICINE
Payer: MEDICAID

## 2019-10-05 VITALS
HEART RATE: 72 BPM | HEIGHT: 65 IN | DIASTOLIC BLOOD PRESSURE: 73 MMHG | SYSTOLIC BLOOD PRESSURE: 136 MMHG | TEMPERATURE: 98 F | OXYGEN SATURATION: 97 % | BODY MASS INDEX: 35.16 KG/M2 | RESPIRATION RATE: 11 BRPM | WEIGHT: 211 LBS

## 2019-10-05 DIAGNOSIS — B34.9 VIRAL SYNDROME: Primary | ICD-10-CM

## 2019-10-05 PROCEDURE — 96361 HYDRATE IV INFUSION ADD-ON: CPT

## 2019-10-05 PROCEDURE — 71046 X-RAY EXAM CHEST 2 VIEWS: CPT | Performed by: EMERGENCY MEDICINE

## 2019-10-05 PROCEDURE — 96374 THER/PROPH/DIAG INJ IV PUSH: CPT

## 2019-10-05 PROCEDURE — 84443 ASSAY THYROID STIM HORMONE: CPT | Performed by: EMERGENCY MEDICINE

## 2019-10-05 PROCEDURE — 87502 INFLUENZA DNA AMP PROBE: CPT | Performed by: EMERGENCY MEDICINE

## 2019-10-05 PROCEDURE — 80053 COMPREHEN METABOLIC PANEL: CPT | Performed by: EMERGENCY MEDICINE

## 2019-10-05 PROCEDURE — 85025 COMPLETE CBC W/AUTO DIFF WBC: CPT | Performed by: EMERGENCY MEDICINE

## 2019-10-05 PROCEDURE — 99284 EMERGENCY DEPT VISIT MOD MDM: CPT

## 2019-10-05 PROCEDURE — 87798 DETECT AGENT NOS DNA AMP: CPT | Performed by: EMERGENCY MEDICINE

## 2019-10-05 PROCEDURE — 81025 URINE PREGNANCY TEST: CPT

## 2019-10-05 PROCEDURE — 87999 UNLISTED MICROBIOLOGY PX: CPT

## 2019-10-05 RX ORDER — ACETAMINOPHEN 500 MG
1000 TABLET ORAL ONCE
Status: COMPLETED | OUTPATIENT
Start: 2019-10-05 | End: 2019-10-05

## 2019-10-05 RX ORDER — KETOROLAC TROMETHAMINE 30 MG/ML
15 INJECTION, SOLUTION INTRAMUSCULAR; INTRAVENOUS ONCE
Status: COMPLETED | OUTPATIENT
Start: 2019-10-05 | End: 2019-10-05

## 2019-10-05 NOTE — ED PROVIDER NOTES
Patient Seen in: [facility] Emergency Department      History   Patient presents with:  Fatigue (constitutional, neurologic)    Stated Complaint: achy    HPI    This is a 20-year-old female complaining of generalized achiness.   The patient states th alert and oriented x3 no acute distress HEENT exam the oropharynx is clear oral mucosa is moist eyes normal tympanic memories normal neck is supple no nuchal rigidity lymphadenopathy or JVD.   Lungs are clear to auscultation cardiovascular exam shows regula diagnosis)    Disposition:  Discharge  10/5/2019  4:11 pm    Follow-up:  MD [facility]   [address] [facility]  [phone]    In 2 days  As needed        Medications Prescribed:  Discharge Medication List as of 10

## 2019-10-05 NOTE — ED INITIAL ASSESSMENT (HPI)
C/o fatigue and generalized pain since Thursday. States \"everything hurts\". Reports she has been getting her period every 2 weeks and does not know if it's co-related. C/o feeling hot/cold, no known fever. Has had a productive cough.

## 2020-02-20 PROCEDURE — 88305 TISSUE EXAM BY PATHOLOGIST: CPT | Performed by: OBSTETRICS & GYNECOLOGY

## 2020-02-20 PROCEDURE — 88342 IMHCHEM/IMCYTCHM 1ST ANTB: CPT | Performed by: OBSTETRICS & GYNECOLOGY

## 2020-02-24 ENCOUNTER — OFFICE VISIT (OUTPATIENT)
Dept: FAMILY MEDICINE CLINIC | Facility: CLINIC | Age: 40
End: 2020-02-24
Payer: MEDICAID

## 2020-02-24 ENCOUNTER — HOSPITAL ENCOUNTER (OUTPATIENT)
Dept: GENERAL RADIOLOGY | Age: 40
Discharge: HOME | End: 2020-02-24
Attending: NURSE PRACTITIONER
Payer: MEDICAID

## 2020-02-24 VITALS
SYSTOLIC BLOOD PRESSURE: 144 MMHG | HEART RATE: 82 BPM | BODY MASS INDEX: 36.12 KG/M2 | RESPIRATION RATE: 14 BRPM | HEIGHT: 65 IN | OXYGEN SATURATION: 98 % | DIASTOLIC BLOOD PRESSURE: 88 MMHG | WEIGHT: 216.81 LBS | TEMPERATURE: 99 F

## 2020-02-24 DIAGNOSIS — J01.00 ACUTE MAXILLARY SINUSITIS, RECURRENCE NOT SPECIFIED: ICD-10-CM

## 2020-02-24 DIAGNOSIS — R68.89 FLU-LIKE SYMPTOMS: Primary | ICD-10-CM

## 2020-02-24 DIAGNOSIS — F17.200 TOBACCO USE DISORDER: ICD-10-CM

## 2020-02-24 DIAGNOSIS — J98.01 BRONCHOSPASM: ICD-10-CM

## 2020-02-24 DIAGNOSIS — R68.89 FLU-LIKE SYMPTOMS: ICD-10-CM

## 2020-02-24 LAB
OPERATOR ID: NORMAL
POCT INFLUENZA A: NEGATIVE
POCT INFLUENZA B: NEGATIVE

## 2020-02-24 PROCEDURE — 71046 X-RAY EXAM CHEST 2 VIEWS: CPT | Performed by: NURSE PRACTITIONER

## 2020-02-24 PROCEDURE — 87502 INFLUENZA DNA AMP PROBE: CPT | Performed by: NURSE PRACTITIONER

## 2020-02-24 PROCEDURE — 99203 OFFICE O/P NEW LOW 30 MIN: CPT | Performed by: NURSE PRACTITIONER

## 2020-02-24 PROCEDURE — 94640 AIRWAY INHALATION TREATMENT: CPT | Performed by: NURSE PRACTITIONER

## 2020-02-24 RX ORDER — AMOXICILLIN AND CLAVULANATE POTASSIUM 875; 125 MG/1; MG/1
1 TABLET, FILM COATED ORAL 2 TIMES DAILY
Qty: 20 TABLET | Refills: 0 | Status: SHIPPED | OUTPATIENT
Start: 2020-02-24 | End: 2020-03-05

## 2020-02-24 RX ORDER — ALBUTEROL SULFATE 90 UG/1
2 AEROSOL, METERED RESPIRATORY (INHALATION) EVERY 4 HOURS PRN
Qty: 1 INHALER | Refills: 0 | Status: SHIPPED | OUTPATIENT
Start: 2020-02-24 | End: 2020-09-21

## 2020-02-24 RX ORDER — IPRATROPIUM BROMIDE AND ALBUTEROL SULFATE 2.5; .5 MG/3ML; MG/3ML
3 SOLUTION RESPIRATORY (INHALATION) ONCE
Status: COMPLETED | OUTPATIENT
Start: 2020-02-24 | End: 2020-02-24

## 2020-02-24 RX ADMIN — IPRATROPIUM BROMIDE AND ALBUTEROL SULFATE 3 ML: 2.5; .5 SOLUTION RESPIRATORY (INHALATION) at 11:59:00

## 2020-02-24 NOTE — PROGRESS NOTES
CHIEF COMPLAINT:   Patient presents with:  Flu: couging up mucus, fatigue, headache, body ache, ear pain x last wednesday       HPI:   Tavo Salomon is a 46year old female who presents for upper respiratory symptoms for  5 days.  Patient reports HEENT: See HPI  LUNGS: See HPI  CARDIOVASCULAR: denies chest pain or palpitations   GI: denies N/V/C or abdominal pain      EXAM:   /88   Pulse 82   Temp 99.2 °F (37.3 °C) (Oral)   Resp 14   Ht 65\"   Wt 216 lb 12.8 oz (98.3 kg)   LMP 02/24/2020   Sp • Albuterol Sulfate HFA (VENTOLIN HFA) 108 (90 Base) MCG/ACT Inhalation Aero Soln 1 Inhaler 0     Sig: Inhale 2 puffs into the lungs every 4 (four) hours as needed for Wheezing. Risks, benefits, and side effects of medication explained and discussed. · A chest X-ray. This is done if your healthcare provider thinks you have pneumonia. · Tests to check for an underlying condition. Other tests may be done to check for things such as allergies, asthma, or COPD (chronic obstructive pulmonary disease).  You · Take the medicines as directed. For instance, some medicines should be taken with food. · Ask about side effects. Ask your provider or pharmacist what side effects are common, and what to do about them.   Follow-up care  You should see your provider elaina © 2335-4461 [address] [facility] [address] [place]. All rights reserved. This information is not intended as a substitute for professional medical care. Always follow your healthcare professional's instructions.         [employer] © 9749-4999 [address]. [address] [facility], [facility] [place]. All rights reserved. This information is not intended as a substitute for professional medical care. Always follow your healthcare professional's instructions.

## 2020-02-24 NOTE — PATIENT INSTRUCTIONS
What Is Acute Bronchitis? Acute bronchitis is when the airways in your lungs (bronchial tubes) becomered and swollen (inflamed). It is usually caused by a viral infection. But it can also occur because of a bacteria or allergen.  Symptoms include a cough The main treatment for bronchitis is easing symptoms. Avoiding smoke, allergens, and other things that trigger coughing can often help. If the infection is bacterial, you may be given antibiotics. During the illness, it's important to get plenty of sleep. You should see your provider again in 2 to 3 weeks. By this time, symptoms should have improved. An infection that lasts longer may mean you have a more serious problem. Prevention  · Avoid tobacco smoke. If you smoke, quit. Stay away from smoky places.  A Sinusitis can often be managed with self-care. Self-care can keep sinuses moist and make you feel more comfortable. Remember to follow your doctor's instructions closely. This can make a big difference in getting your sinus problem under control.   Drink fl

## 2020-03-04 ENCOUNTER — OFFICE VISIT (OUTPATIENT)
Dept: INTERNAL MEDICINE CLINIC | Facility: CLINIC | Age: 40
End: 2020-03-04
Payer: MEDICAID

## 2020-03-04 ENCOUNTER — LAB ENCOUNTER (OUTPATIENT)
Dept: LAB | Age: 40
End: 2020-03-04
Attending: INTERNAL MEDICINE
Payer: MEDICAID

## 2020-03-04 VITALS
HEART RATE: 79 BPM | WEIGHT: 217.38 LBS | BODY MASS INDEX: 36.22 KG/M2 | TEMPERATURE: 98 F | HEIGHT: 65 IN | DIASTOLIC BLOOD PRESSURE: 84 MMHG | SYSTOLIC BLOOD PRESSURE: 118 MMHG | RESPIRATION RATE: 16 BRPM

## 2020-03-04 DIAGNOSIS — F41.9 ANXIETY: ICD-10-CM

## 2020-03-04 DIAGNOSIS — Z13.228 SCREENING FOR METABOLIC DISORDER: ICD-10-CM

## 2020-03-04 DIAGNOSIS — Z13.220 SCREENING FOR LIPID DISORDERS: ICD-10-CM

## 2020-03-04 DIAGNOSIS — F17.200 TOBACCO DEPENDENCE: ICD-10-CM

## 2020-03-04 DIAGNOSIS — Z13.0 SCREENING FOR BLOOD DISEASE: ICD-10-CM

## 2020-03-04 DIAGNOSIS — Z13.29 THYROID DISORDER SCREENING: ICD-10-CM

## 2020-03-04 DIAGNOSIS — F17.210 CIGARETTE SMOKER: ICD-10-CM

## 2020-03-04 DIAGNOSIS — J45.20 MILD INTERMITTENT ASTHMA WITHOUT COMPLICATION: ICD-10-CM

## 2020-03-04 DIAGNOSIS — Z23 NEEDS FLU SHOT: ICD-10-CM

## 2020-03-04 DIAGNOSIS — Z00.00 LABORATORY EXAMINATION ORDERED AS PART OF A COMPLETE PHYSICAL EXAMINATION: ICD-10-CM

## 2020-03-04 DIAGNOSIS — Z76.89 ESTABLISHING CARE WITH NEW DOCTOR, ENCOUNTER FOR: Primary | ICD-10-CM

## 2020-03-04 LAB
ALBUMIN SERPL-MCNC: 4 G/DL (ref 3.4–5)
ALBUMIN/GLOB SERPL: 1.2 {RATIO} (ref 1–2)
ALP LIVER SERPL-CCNC: 67 U/L (ref 37–98)
ALT SERPL-CCNC: 38 U/L (ref 13–56)
ANION GAP SERPL CALC-SCNC: 2 MMOL/L (ref 0–18)
AST SERPL-CCNC: 17 U/L (ref 15–37)
BASOPHILS # BLD AUTO: 0.06 X10(3) UL (ref 0–0.2)
BASOPHILS NFR BLD AUTO: 0.8 %
BILIRUB SERPL-MCNC: 0.3 MG/DL (ref 0.1–2)
BUN BLD-MCNC: 9 MG/DL (ref 7–18)
BUN/CREAT SERPL: 12.7 (ref 10–20)
CALCIUM BLD-MCNC: 8.6 MG/DL (ref 8.5–10.1)
CHLORIDE SERPL-SCNC: 110 MMOL/L (ref 98–112)
CHOLEST SMN-MCNC: 207 MG/DL (ref ?–200)
CO2 SERPL-SCNC: 25 MMOL/L (ref 21–32)
CREAT BLD-MCNC: 0.71 MG/DL (ref 0.55–1.02)
DEPRECATED RDW RBC AUTO: 42.5 FL (ref 35.1–46.3)
EOSINOPHIL # BLD AUTO: 0.31 X10(3) UL (ref 0–0.7)
EOSINOPHIL NFR BLD AUTO: 4 %
ERYTHROCYTE [DISTWIDTH] IN BLOOD BY AUTOMATED COUNT: 13.2 % (ref 11–15)
GLOBULIN PLAS-MCNC: 3.4 G/DL (ref 2.8–4.4)
GLUCOSE BLD-MCNC: 85 MG/DL (ref 70–99)
HCT VFR BLD AUTO: 42.8 % (ref 35–48)
HDLC SERPL-MCNC: 44 MG/DL (ref 40–59)
HGB BLD-MCNC: 14.4 G/DL (ref 12–16)
IMM GRANULOCYTES # BLD AUTO: 0.04 X10(3) UL (ref 0–1)
IMM GRANULOCYTES NFR BLD: 0.5 %
LDLC SERPL CALC-MCNC: 147 MG/DL (ref ?–100)
LYMPHOCYTES # BLD AUTO: 2.97 X10(3) UL (ref 1–4)
LYMPHOCYTES NFR BLD AUTO: 38.8 %
M PROTEIN MFR SERPL ELPH: 7.4 G/DL (ref 6.4–8.2)
MCH RBC QN AUTO: 29.6 PG (ref 26–34)
MCHC RBC AUTO-ENTMCNC: 33.6 G/DL (ref 31–37)
MCV RBC AUTO: 87.9 FL (ref 80–100)
MONOCYTES # BLD AUTO: 0.6 X10(3) UL (ref 0.1–1)
MONOCYTES NFR BLD AUTO: 7.8 %
NEUTROPHILS # BLD AUTO: 3.68 X10 (3) UL (ref 1.5–7.7)
NEUTROPHILS # BLD AUTO: 3.68 X10(3) UL (ref 1.5–7.7)
NEUTROPHILS NFR BLD AUTO: 48.1 %
NONHDLC SERPL-MCNC: 163 MG/DL (ref ?–130)
OSMOLALITY SERPL CALC.SUM OF ELEC: 282 MOSM/KG (ref 275–295)
PATIENT FASTING Y/N/NP: YES
PATIENT FASTING Y/N/NP: YES
PLATELET # BLD AUTO: 380 10(3)UL (ref 150–450)
POTASSIUM SERPL-SCNC: 4.5 MMOL/L (ref 3.5–5.1)
RBC # BLD AUTO: 4.87 X10(6)UL (ref 3.8–5.3)
SODIUM SERPL-SCNC: 137 MMOL/L (ref 136–145)
TRIGL SERPL-MCNC: 82 MG/DL (ref 30–149)
TSI SER-ACNC: 0.85 MIU/ML (ref 0.36–3.74)
VLDLC SERPL CALC-MCNC: 16 MG/DL (ref 0–30)
WBC # BLD AUTO: 7.7 X10(3) UL (ref 4–11)

## 2020-03-04 PROCEDURE — 99204 OFFICE O/P NEW MOD 45 MIN: CPT | Performed by: INTERNAL MEDICINE

## 2020-03-04 PROCEDURE — 80061 LIPID PANEL: CPT

## 2020-03-04 PROCEDURE — 90471 IMMUNIZATION ADMIN: CPT | Performed by: INTERNAL MEDICINE

## 2020-03-04 PROCEDURE — 99406 BEHAV CHNG SMOKING 3-10 MIN: CPT | Performed by: INTERNAL MEDICINE

## 2020-03-04 PROCEDURE — 80053 COMPREHEN METABOLIC PANEL: CPT

## 2020-03-04 PROCEDURE — 84443 ASSAY THYROID STIM HORMONE: CPT

## 2020-03-04 PROCEDURE — 90686 IIV4 VACC NO PRSV 0.5 ML IM: CPT | Performed by: INTERNAL MEDICINE

## 2020-03-04 PROCEDURE — 85025 COMPLETE CBC W/AUTO DIFF WBC: CPT

## 2020-03-04 RX ORDER — CLONAZEPAM 1 MG/1
1 TABLET ORAL DAILY PRN
Qty: 4 TABLET | Refills: 0 | Status: SHIPPED | OUTPATIENT
Start: 2020-03-04

## 2020-03-04 NOTE — PROGRESS NOTES
Randa Graham  4/30/1980    Patient presents with:  Establish Care: BETHANY Rm 1,       SUBJECTIVE   Randa Graham is a 46year old female who presents to establish care.     The patient was recently managed for flu-like symptoms and maxillar Every Day Smoker        Packs/day: 0.50        Types: Cigarettes      Smokeless tobacco: Never Used    Alcohol use: Yes      Frequency: Monthly or less      Drinks per session: 1 or 2      Comment: socially    Drug use: No        OBJECTIVE:   /84 (BP patient indicates understanding of these issues and agrees to the plan.     TODAY'S ORDERS     Orders Placed This Encounter      Flulaval 6 months and older 0.5 ml Quad PF [46606]      Meds & Refills:  Requested Prescriptions      No prescriptions requested

## 2020-03-04 NOTE — PROGRESS NOTES
Tobacco Cessation Documentation (Smoking and Smokeless included): I had an in depth therapy session with Brooklyn Clay about her tobacco use risks and options using the [employer]'s Five A's approach:    Ask: Joy Dent is using tobacco products.

## 2020-03-17 ENCOUNTER — TELEPHONE (OUTPATIENT)
Dept: INTERNAL MEDICINE CLINIC | Facility: CLINIC | Age: 40
End: 2020-03-17

## 2020-03-17 DIAGNOSIS — E78.5 DYSLIPIDEMIA: Primary | ICD-10-CM

## 2020-03-17 NOTE — TELEPHONE ENCOUNTER
Called patient twice no answer. Did not leave voicemail with medical information. Findings are significant for an LDL level above goal of 130 for which strict lifestyle management is advised.  Repeat in 3 months (ordered) and discuss medical vs continued li

## 2020-03-17 NOTE — TELEPHONE ENCOUNTER
Pt was scheduled to go over results today and appt has to be cxl Informed pt I would take a message re: results.  Please advise

## 2020-10-31 ENCOUNTER — HOSPITAL ENCOUNTER (OUTPATIENT)
Age: 40
Discharge: HOME | End: 2020-10-31
Payer: MEDICAID

## 2020-10-31 VITALS
OXYGEN SATURATION: 98 % | RESPIRATION RATE: 16 BRPM | SYSTOLIC BLOOD PRESSURE: 135 MMHG | HEART RATE: 77 BPM | DIASTOLIC BLOOD PRESSURE: 90 MMHG | TEMPERATURE: 98 F

## 2020-10-31 DIAGNOSIS — Z20.822 ENCOUNTER FOR LABORATORY TESTING FOR COVID-19 VIRUS: Primary | ICD-10-CM

## 2020-10-31 PROCEDURE — 99213 OFFICE O/P EST LOW 20 MIN: CPT | Performed by: NURSE PRACTITIONER

## 2020-10-31 RX ORDER — ACETAMINOPHEN 325 MG/1
650 TABLET ORAL ONCE
Status: COMPLETED | OUTPATIENT
Start: 2020-10-31 | End: 2020-10-31

## 2020-10-31 NOTE — ED PROVIDER NOTES
Patient Seen in: Immediate [address]      History   Patient presents with:  Headache    Stated Complaint: cough exposed     Patient presents to immediate care for COVID testing.   Patient states they have had a positive exposure within the las RT-PCR ([facility])                  MDM      23-year-old female presents the immediate care for Covid testing after exposure. Vital signs are stable at time of triage.   Plan to swab patient for Covid, Tylenol given here in the immediate care states her body a

## 2020-11-12 ENCOUNTER — HOSPITAL ENCOUNTER (OUTPATIENT)
Age: 40
Discharge: HOME | End: 2020-11-12
Payer: MEDICAID

## 2020-11-12 VITALS
HEIGHT: 65 IN | DIASTOLIC BLOOD PRESSURE: 86 MMHG | SYSTOLIC BLOOD PRESSURE: 125 MMHG | WEIGHT: 200 LBS | RESPIRATION RATE: 18 BRPM | OXYGEN SATURATION: 98 % | BODY MASS INDEX: 33.32 KG/M2 | HEART RATE: 86 BPM | TEMPERATURE: 98 F

## 2020-11-12 DIAGNOSIS — Z20.822 ENCOUNTER FOR SCREENING LABORATORY TESTING FOR COVID-19 VIRUS: ICD-10-CM

## 2020-11-12 DIAGNOSIS — B34.9 VIRAL SYNDROME: Primary | ICD-10-CM

## 2020-11-12 PROCEDURE — 99214 OFFICE O/P EST MOD 30 MIN: CPT | Performed by: PHYSICIAN ASSISTANT

## 2020-11-12 RX ORDER — ALBUTEROL SULFATE 90 UG/1
2 AEROSOL, METERED RESPIRATORY (INHALATION) EVERY 4 HOURS PRN
Qty: 1 INHALER | Refills: 0 | Status: SHIPPED | OUTPATIENT
Start: 2020-11-12 | End: 2020-12-12

## 2020-11-12 NOTE — ED PROVIDER NOTES
Patient Seen in: [facility] [address]      History   Patient presents with:  Headache    Stated Complaint: exposure, fever, bodyaches, headache, sore throat, cough x1 day    HPI    21-year-old female with a history of asthma presents to the im 165.1 cm (5' 5\")   Wt 90.7 kg   LMP 11/12/2020   SpO2 98%   BMI 33.28 kg/m²         Physical Exam  Vitals signs and nursing note reviewed. Constitutional:       Appearance: She is well-developed. HENT:      Head: Atraumatic.       Right Ear: External e

## 2020-11-12 NOTE — ED INITIAL ASSESSMENT (HPI)
C/o symptoms since last night including headache, ears hurt, cough with thick reddish-green phlegm, feelign hot and cold at home, body aches, sore throat x 2 days, Some RUSSELL and chest tightness but states she does not feel her asthma is acting up.  Pt trevin

## 2020-12-02 ENCOUNTER — TELEPHONE (OUTPATIENT)
Dept: INTERNAL MEDICINE CLINIC | Facility: CLINIC | Age: 40
End: 2020-12-02

## 2020-12-02 ENCOUNTER — TELEMEDICINE (OUTPATIENT)
Dept: INTERNAL MEDICINE CLINIC | Facility: CLINIC | Age: 40
End: 2020-12-02
Payer: MEDICAID

## 2020-12-02 DIAGNOSIS — J98.01 BRONCHOSPASM: ICD-10-CM

## 2020-12-02 DIAGNOSIS — J06.9 ACUTE URI: Primary | ICD-10-CM

## 2020-12-02 PROCEDURE — 99213 OFFICE O/P EST LOW 20 MIN: CPT | Performed by: NURSE PRACTITIONER

## 2020-12-02 RX ORDER — CODEINE PHOSPHATE AND GUAIFENESIN 10; 100 MG/5ML; MG/5ML
10 SOLUTION ORAL NIGHTLY PRN
Qty: 240 ML | Refills: 0 | Status: SHIPPED | OUTPATIENT
Start: 2020-12-02 | End: 2021-04-13 | Stop reason: ALTCHOICE

## 2020-12-02 RX ORDER — AZITHROMYCIN 250 MG/1
TABLET, FILM COATED ORAL
Qty: 6 TABLET | Refills: 0 | Status: SHIPPED | OUTPATIENT
Start: 2020-12-02 | End: 2020-12-07

## 2020-12-02 NOTE — PROGRESS NOTES
Due to COVID-19 ACTION PLAN, the patient's office visit was converted to a video visit. This visit is conducted using video and audio. The patient consents to this service.   The patient understands and accepts financial responsibility for any deductible, constipation      EXAM:   Limited exam as this is a video visit. Appropriate affect, alert and oriented x 3. No distress. No conversational dyspnea. Speaking in full sentences. Ill appearing.       ASSESSMENT AND PLAN:     Acute uri  (primary encounter decision-making and tests are ordered as detailed in the plan of care below. Altagraciamulugeta Amparonadir understands phone evaluation is not a substitute for face-to-face examination or emergency care.  Patient advised to go to ER or call 911 for worsening s

## 2020-12-03 RX ORDER — BUDESONIDE AND FORMOTEROL FUMARATE DIHYDRATE 160; 4.5 UG/1; UG/1
2 AEROSOL RESPIRATORY (INHALATION) 2 TIMES DAILY
Qty: 1 INHALER | Refills: 1 | Status: SHIPPED | OUTPATIENT
Start: 2020-12-03 | End: 2021-08-23

## 2020-12-03 NOTE — TELEPHONE ENCOUNTER
Fwd to SD,   Please review and advise if ok to change to [facility] or [facility].

## 2021-03-25 ENCOUNTER — IMMUNIZATION (OUTPATIENT)
Dept: LAB | Age: 41
End: 2021-03-25
Attending: HOSPITALIST
Payer: MEDICAID

## 2021-03-25 DIAGNOSIS — Z23 NEED FOR VACCINATION: Primary | ICD-10-CM

## 2021-03-25 PROCEDURE — 0001A SARSCOV2 VAC 30MCG/0.3ML IM: CPT

## 2021-04-15 ENCOUNTER — IMMUNIZATION (OUTPATIENT)
Dept: LAB | Age: 41
End: 2021-04-15
Attending: HOSPITALIST
Payer: MEDICAID

## 2021-04-15 DIAGNOSIS — Z23 NEED FOR VACCINATION: Primary | ICD-10-CM

## 2021-04-15 PROCEDURE — 0002A SARSCOV2 VAC 30MCG/0.3ML IM: CPT

## 2021-07-02 NOTE — ED NOTES
How Severe Is Your Rash?: moderate
MD at bedside.
Is This A New Presentation, Or A Follow-Up?: Rash
Additional History: Pt presents for itchy, scaly skin on feet x2.5 yrs. pt c/o joint pain in legs and hips. Pt has tried keto cr and nystatin but had no relief. Denies hx of psoriasis.

## 2021-08-03 ENCOUNTER — OFFICE VISIT (OUTPATIENT)
Dept: INTERNAL MEDICINE CLINIC | Facility: CLINIC | Age: 41
End: 2021-08-03
Payer: MEDICAID

## 2021-08-03 VITALS
WEIGHT: 209 LBS | DIASTOLIC BLOOD PRESSURE: 66 MMHG | HEART RATE: 82 BPM | TEMPERATURE: 98 F | BODY MASS INDEX: 34.82 KG/M2 | SYSTOLIC BLOOD PRESSURE: 118 MMHG | HEIGHT: 65 IN

## 2021-08-03 DIAGNOSIS — L50.2 URTICARIA DUE TO HEAT: Primary | ICD-10-CM

## 2021-08-03 DIAGNOSIS — E66.9 CLASS 1 OBESITY: ICD-10-CM

## 2021-08-03 DIAGNOSIS — Z13.0 SCREENING FOR BLOOD DISEASE: ICD-10-CM

## 2021-08-03 DIAGNOSIS — Q65.9 HIP DEFORMITY, CONGENITAL: ICD-10-CM

## 2021-08-03 DIAGNOSIS — Z13.29 SCREENING FOR THYROID DISORDER: ICD-10-CM

## 2021-08-03 DIAGNOSIS — Z00.00 LABORATORY EXAMINATION ORDERED AS PART OF A COMPLETE PHYSICAL EXAMINATION: ICD-10-CM

## 2021-08-03 DIAGNOSIS — Z13.220 SCREENING FOR LIPID DISORDERS: ICD-10-CM

## 2021-08-03 DIAGNOSIS — R53.82 CHRONIC FATIGUE: ICD-10-CM

## 2021-08-03 DIAGNOSIS — Z13.228 SCREENING FOR METABOLIC DISORDER: ICD-10-CM

## 2021-08-03 PROCEDURE — 3078F DIAST BP <80 MM HG: CPT | Performed by: INTERNAL MEDICINE

## 2021-08-03 PROCEDURE — 3008F BODY MASS INDEX DOCD: CPT | Performed by: INTERNAL MEDICINE

## 2021-08-03 PROCEDURE — 3074F SYST BP LT 130 MM HG: CPT | Performed by: INTERNAL MEDICINE

## 2021-08-03 PROCEDURE — 99215 OFFICE O/P EST HI 40 MIN: CPT | Performed by: INTERNAL MEDICINE

## 2021-08-03 NOTE — PROGRESS NOTES
Molinamanueljames Gladys  4/30/1980    Patient presents with:  Derm Problem: AJ rm 6 rash noticed with exposure to sun.   Ankle Pain: bilateral ankle pain and swelling      SUBJECTIVE   Randa Graham is a 37year old female who presents with multip spit after use 1 Inhaler 1   • fluticasone-salmeterol 115-21 MCG/ACT Inhalation Aerosol Inhale 1 puff into the lungs 2 (two) times daily. 1 Inhaler 0   • clonazePAM 1 MG Oral Tab Take 1 tablet (1 mg total) by mouth daily as needed for Anxiety.  4 tablet 0 supple. Normal carotid pulses. No masses. Cardiovascular: Normal rate, regular rhythm and intact distal pulses. No murmur, rubs or gallops. Pulmonary/Chest: Effort normal and breath sounds normal. No respiratory distress. Abdominal: Soft.  Bowel sounds

## 2021-08-10 ENCOUNTER — TELEPHONE (OUTPATIENT)
Dept: ORTHOPEDICS CLINIC | Facility: CLINIC | Age: 41
End: 2021-08-10

## 2021-08-10 NOTE — TELEPHONE ENCOUNTER
Patient coming in for bilateral hip pain. Had previous surgery . Patient has weight baring x-ray's scheduled. Please advise if further imaging is needed.   Future Appointments   Date Time Provider Neema Keller   8/20/2021  9:45 AM PF XR RM1 PF XRAY [place]

## 2021-08-19 ENCOUNTER — HOSPITAL ENCOUNTER (EMERGENCY)
Age: 41
Discharge: HOME | End: 2021-08-19
Attending: EMERGENCY MEDICINE
Payer: MEDICAID

## 2021-08-19 ENCOUNTER — APPOINTMENT (OUTPATIENT)
Dept: GENERAL RADIOLOGY | Age: 41
End: 2021-08-19
Attending: EMERGENCY MEDICINE
Payer: MEDICAID

## 2021-08-19 VITALS
HEIGHT: 65 IN | TEMPERATURE: 99 F | WEIGHT: 210 LBS | DIASTOLIC BLOOD PRESSURE: 83 MMHG | OXYGEN SATURATION: 99 % | RESPIRATION RATE: 20 BRPM | HEART RATE: 80 BPM | BODY MASS INDEX: 34.99 KG/M2 | SYSTOLIC BLOOD PRESSURE: 140 MMHG

## 2021-08-19 DIAGNOSIS — J98.01 BRONCHOSPASM: Primary | ICD-10-CM

## 2021-08-19 LAB — SARS-COV-2 RNA RESP QL NAA+PROBE: NOT DETECTED

## 2021-08-19 PROCEDURE — 99283 EMERGENCY DEPT VISIT LOW MDM: CPT

## 2021-08-19 PROCEDURE — 94640 AIRWAY INHALATION TREATMENT: CPT

## 2021-08-19 PROCEDURE — 71045 X-RAY EXAM CHEST 1 VIEW: CPT | Performed by: EMERGENCY MEDICINE

## 2021-08-19 RX ORDER — ALBUTEROL SULFATE 90 UG/1
8 AEROSOL, METERED RESPIRATORY (INHALATION) ONCE
Status: COMPLETED | OUTPATIENT
Start: 2021-08-19 | End: 2021-08-19

## 2021-08-19 RX ORDER — ALBUTEROL SULFATE 90 UG/1
2 AEROSOL, METERED RESPIRATORY (INHALATION) EVERY 4 HOURS PRN
Qty: 1 EACH | Refills: 0 | Status: SHIPPED | OUTPATIENT
Start: 2021-08-19 | End: 2021-09-18

## 2021-08-19 RX ORDER — PROMETHAZINE HYDROCHLORIDE AND CODEINE PHOSPHATE 6.25; 1 MG/5ML; MG/5ML
5 SYRUP ORAL EVERY 4 HOURS PRN
Qty: 120 ML | Refills: 0 | Status: SHIPPED | OUTPATIENT
Start: 2021-08-19 | End: 2021-09-18

## 2021-08-19 RX ORDER — PREDNISONE 20 MG/1
60 TABLET ORAL ONCE
Status: COMPLETED | OUTPATIENT
Start: 2021-08-19 | End: 2021-08-19

## 2021-08-19 RX ORDER — PREDNISONE 20 MG/1
40 TABLET ORAL DAILY
Qty: 10 TABLET | Refills: 0 | Status: SHIPPED | OUTPATIENT
Start: 2021-08-19 | End: 2021-08-24

## 2021-08-20 NOTE — ED PROVIDER NOTES
Patient Seen in: THE [facility] Emergency Department In [place]      History   Patient presents with:  Difficulty Breathing    Stated Complaint: cough Hx asthma     HPI/Subjective:   HPI    56-year-old female with history of asthma, smoking, comes with 2-day [08/19/21 2205]   /83   Pulse 80   Resp 20   Temp 98.9 °F (37.2 °C)   Temp src Temporal   SpO2 99 %   O2 Device None (Room air)       Current:/83   Pulse 80   Temp 98.9 °F (37.2 °C) (Temporal)   Resp 20   Ht 165.1 cm (5' 5\")   Wt 95.3 kg   LMP any known fever. She has a history     of asthma. FINDINGS:  The lungs are clear. Cardiomediastinal silhouette and     vascularity are unremarkable.   No significant osseous abnormalities.                               =====    CONCLUSION: hours as needed for cough.   Qty: 120 mL Refills: 0

## 2021-08-23 ENCOUNTER — TELEPHONE (OUTPATIENT)
Dept: INTERNAL MEDICINE CLINIC | Facility: CLINIC | Age: 41
End: 2021-08-23

## 2021-08-23 ENCOUNTER — OFFICE VISIT (OUTPATIENT)
Dept: INTERNAL MEDICINE CLINIC | Facility: CLINIC | Age: 41
End: 2021-08-23
Payer: MEDICAID

## 2021-08-23 VITALS
SYSTOLIC BLOOD PRESSURE: 114 MMHG | DIASTOLIC BLOOD PRESSURE: 82 MMHG | HEART RATE: 88 BPM | WEIGHT: 211 LBS | TEMPERATURE: 99 F | HEIGHT: 65 IN | BODY MASS INDEX: 35.16 KG/M2 | OXYGEN SATURATION: 98 %

## 2021-08-23 DIAGNOSIS — J45.21 MILD INTERMITTENT ASTHMA WITH ACUTE EXACERBATION: Primary | ICD-10-CM

## 2021-08-23 PROCEDURE — 3008F BODY MASS INDEX DOCD: CPT | Performed by: FAMILY MEDICINE

## 2021-08-23 PROCEDURE — 3079F DIAST BP 80-89 MM HG: CPT | Performed by: FAMILY MEDICINE

## 2021-08-23 PROCEDURE — 3074F SYST BP LT 130 MM HG: CPT | Performed by: FAMILY MEDICINE

## 2021-08-23 PROCEDURE — 99213 OFFICE O/P EST LOW 20 MIN: CPT | Performed by: FAMILY MEDICINE

## 2021-08-23 RX ORDER — BENZONATATE 100 MG/1
100 CAPSULE ORAL 3 TIMES DAILY PRN
Qty: 30 CAPSULE | Refills: 0 | Status: SHIPPED | OUTPATIENT
Start: 2021-08-23

## 2021-08-23 RX ORDER — PREDNISONE 10 MG/1
TABLET ORAL
Qty: 8 TABLET | Refills: 0 | Status: SHIPPED | OUTPATIENT
Start: 2021-08-23 | End: 2021-09-20

## 2021-08-23 RX ORDER — BUDESONIDE AND FORMOTEROL FUMARATE DIHYDRATE 160; 4.5 UG/1; UG/1
2 AEROSOL RESPIRATORY (INHALATION) 2 TIMES DAILY
Qty: 10.2 G | Refills: 0 | Status: SHIPPED | OUTPATIENT
Start: 2021-08-23

## 2021-08-23 NOTE — TELEPHONE ENCOUNTER
Collected:  8/19/2021 10:34 PM   Status:  Final result  Specimen Information: Nares; Other         0 Result Notes  Component   Ref Range & Units    Rapid SARS-CoV-2 by PCR   Not Detected Not Detected            Also vaccinated.  Please advise if would prefe

## 2021-08-23 NOTE — TELEPHONE ENCOUNTER
Pt was in ER on 8/19 for bronchitis/cough-she still has cough-no fever-ok to come in for appt today w/MK at 1:20?

## 2021-08-23 NOTE — PROGRESS NOTES
Randa Graham  4/30/1980    Patient presents with:  ER F/U: Jimmie James rm 8 ER f/u bhroncospasm      HPI:   Randa Graham is a 37year old female who presents for follow-up on her recent ER visit.  She was seen in the ED on 8/19 for cough and br disorder    • Tendonitis of ankle       Patient Active Problem List:     Bartholin cyst     Dehydration     Bronchospasm     Past Surgical History:   Procedure Laterality Date   • COLPOSCOPY,BX CERVIX/ENDOCERV CURR  02/20/2020    NL    • COLPOSCOPY,BX CERV nourished,in no apparent distress  SKIN: No rashes,no suspicious lesions  EYES: PERRLA, EOMI, normal optic disk,conjunctiva are clear  HEENT: atraumatic, normocephalic,ears and throat are clear  NECK: supple,no adenopathy,no bruits  LUNGS: no wheezing, goo

## 2021-08-25 ENCOUNTER — OFFICE VISIT (OUTPATIENT)
Dept: SLEEP CENTER | Age: 41
End: 2021-08-25
Attending: INTERNAL MEDICINE
Payer: MEDICAID

## 2021-08-25 DIAGNOSIS — R53.82 CHRONIC FATIGUE: ICD-10-CM

## 2021-08-25 DIAGNOSIS — E66.9 CLASS 1 OBESITY: ICD-10-CM

## 2021-08-25 PROCEDURE — 95806 SLEEP STUDY UNATT&RESP EFFT: CPT

## 2021-08-26 ENCOUNTER — HOSPITAL ENCOUNTER (OUTPATIENT)
Dept: GENERAL RADIOLOGY | Age: 41
Discharge: HOME | End: 2021-08-26
Attending: INTERNAL MEDICINE
Payer: MEDICAID

## 2021-08-26 DIAGNOSIS — Q65.9 HIP DEFORMITY, CONGENITAL: ICD-10-CM

## 2021-08-26 PROCEDURE — 73523 X-RAY EXAM HIPS BI 5/> VIEWS: CPT | Performed by: INTERNAL MEDICINE

## 2021-08-27 NOTE — PROCEDURES
[address]       Accredited by the [facility] of [facility] ([employer])    PATIENT'S NAME:        Angélica Marks  ATTENDING PHYSICIAN:   Lisa Dewey M.D. REFERRING PHYSICIAN:   Amish K. Zara Gilford Rafy Reyes M.D.  d: 08/26/2021 15:11:28  t: 08/26/2021 17:03:41  [facility] [id number]  CLIFTON/    cc: Irving Cooper MD

## 2021-09-20 ENCOUNTER — OFFICE VISIT (OUTPATIENT)
Dept: ORTHOPEDICS CLINIC | Facility: CLINIC | Age: 41
End: 2021-09-20
Payer: MEDICAID

## 2021-09-20 VITALS — WEIGHT: 212.19 LBS | HEIGHT: 66 IN | HEART RATE: 72 BPM | OXYGEN SATURATION: 99 % | BODY MASS INDEX: 34.1 KG/M2

## 2021-09-20 DIAGNOSIS — M76.899 TENDINITIS INVOLVING HIP ABDUCTORS, UNSPECIFIED LATERALITY: Primary | ICD-10-CM

## 2021-09-20 PROCEDURE — 3008F BODY MASS INDEX DOCD: CPT | Performed by: ORTHOPAEDIC SURGERY

## 2021-09-20 PROCEDURE — 99203 OFFICE O/P NEW LOW 30 MIN: CPT | Performed by: ORTHOPAEDIC SURGERY

## 2021-09-20 NOTE — H&P
[facility] New Patient Note    CC: Patient presents with:  Hip Pain: bilateral hip pain      HPI: This 37year old female presents today with complaints of bilateral hip discomfort.   Patient states that it is not really pain, more of a discomfort, an Budesonide-Formoterol Fumarate (SYMBICORT) 160-4.5 MCG/ACT Inhalation Aerosol Inhale 2 puffs into the lungs 2 (two) times daily.  10.2 g 0   • benzonatate (TESSALON PERLES) 100 MG Oral Cap Take 1 capsule (100 mg total) by mouth 3 (three) times daily as need thigh  Bilateral lower extremity:  • Inspection: skin intact, no lesions, borderline to many toes sign on both sides  • Palpation: Mild discomfort to palpation near the greater trochanter and down the proximal IT band  • Range of motion: Passive hip flexio

## 2021-10-12 ENCOUNTER — OFFICE VISIT (OUTPATIENT)
Dept: ORTHOPEDICS CLINIC | Facility: CLINIC | Age: 41
End: 2021-10-12
Payer: MEDICAID

## 2021-10-12 VITALS — OXYGEN SATURATION: 100 % | HEART RATE: 68 BPM

## 2021-10-12 DIAGNOSIS — M79.671 PAIN IN BOTH FEET: Primary | ICD-10-CM

## 2021-10-12 DIAGNOSIS — M79.672 PAIN IN BOTH FEET: Primary | ICD-10-CM

## 2021-10-12 DIAGNOSIS — M21.079 ACQUIRED VALGUS DEFORMITY OF ANKLE: ICD-10-CM

## 2021-10-12 DIAGNOSIS — M77.9 TENDONITIS: ICD-10-CM

## 2021-10-12 PROCEDURE — 99202 OFFICE O/P NEW SF 15 MIN: CPT | Performed by: PODIATRIST

## 2021-10-12 NOTE — PROGRESS NOTES
[facility] New Patient Note    CC: Patient presents with:   Foot Pain: bilateral foot pain      HPI: The patient is a 37year old female who presents today with complaints of bilateral foot pain and dropping of arch  Hx of tendonitis of hips and • Other (Kidney Cancer) Mother    • Other (Endometriosis) Mother         Hysterectomy   • Diabetes Paternal Uncle    • Other (Endometriosis) Maternal Grandmother         Hysterectomy   • Other (Endometriosis) Maternal Aunt         Hysterectomy   • Other

## 2021-10-24 ENCOUNTER — APPOINTMENT (OUTPATIENT)
Dept: GENERAL RADIOLOGY | Age: 41
End: 2021-10-24
Attending: EMERGENCY MEDICINE
Payer: MEDICAID

## 2021-10-24 ENCOUNTER — HOSPITAL ENCOUNTER (EMERGENCY)
Age: 41
Discharge: HOME | End: 2021-10-24
Attending: EMERGENCY MEDICINE
Payer: MEDICAID

## 2021-10-24 VITALS
TEMPERATURE: 97 F | SYSTOLIC BLOOD PRESSURE: 118 MMHG | HEIGHT: 65 IN | BODY MASS INDEX: 35.32 KG/M2 | RESPIRATION RATE: 18 BRPM | WEIGHT: 212 LBS | DIASTOLIC BLOOD PRESSURE: 61 MMHG | HEART RATE: 70 BPM | OXYGEN SATURATION: 99 %

## 2021-10-24 DIAGNOSIS — H65.02 ACUTE SEROUS OTITIS MEDIA OF LEFT EAR, RECURRENCE NOT SPECIFIED: Primary | ICD-10-CM

## 2021-10-24 PROCEDURE — 71045 X-RAY EXAM CHEST 1 VIEW: CPT | Performed by: EMERGENCY MEDICINE

## 2021-10-24 PROCEDURE — 99283 EMERGENCY DEPT VISIT LOW MDM: CPT | Performed by: EMERGENCY MEDICINE

## 2021-10-24 PROCEDURE — 99284 EMERGENCY DEPT VISIT MOD MDM: CPT | Performed by: EMERGENCY MEDICINE

## 2021-10-24 RX ORDER — AMOXICILLIN 875 MG/1
875 TABLET, COATED ORAL 2 TIMES DAILY
Qty: 20 TABLET | Refills: 0 | Status: SHIPPED | OUTPATIENT
Start: 2021-10-24 | End: 2021-11-03

## 2021-10-24 NOTE — ED PROVIDER NOTES
Patient Seen in: THE [facility] Emergency Department In [place]      History   Patient presents with:  Cough/URI    Stated Complaint: chills with cough and body aches since yesterday    Subjective:   HPI    28-year-old female presents with coughing of green a yesterday  Other systems are as noted in HPI. Constitutional and vital signs reviewed. All other systems reviewed and negative except as noted above.     Physical Exam     ED Triage Vitals [10/24/21 1506]   /73   Pulse 91   Resp 18   Temp 97.3 ° [facility]     Labs Reviewed   RAPID SARS-COV-2 BY PCR - Normal     XR CHEST AP PORTABLE  (CPT=71045)   Final Result                          =====    PROCEDURE:  XR CHEST AP PORTABLE  (CPT=71045)         TECHNIQUE:  AP chest radiograph was obtained.

## 2021-10-24 NOTE — ED INITIAL ASSESSMENT (HPI)
Bodyaches, coughing green mucous, ears ringing, denies fevers . sx started yesterday , diarrhea. Denies vomiting +nausea. , weakness, fatigue.

## 2021-10-26 ENCOUNTER — TELEPHONE (OUTPATIENT)
Dept: INTERNAL MEDICINE CLINIC | Facility: CLINIC | Age: 41
End: 2021-10-26

## 2021-10-26 ENCOUNTER — OFFICE VISIT (OUTPATIENT)
Dept: INTERNAL MEDICINE CLINIC | Facility: CLINIC | Age: 41
End: 2021-10-26
Payer: MEDICAID

## 2021-10-26 VITALS
DIASTOLIC BLOOD PRESSURE: 80 MMHG | HEART RATE: 98 BPM | WEIGHT: 207.38 LBS | TEMPERATURE: 98 F | HEIGHT: 65 IN | RESPIRATION RATE: 16 BRPM | SYSTOLIC BLOOD PRESSURE: 118 MMHG | BODY MASS INDEX: 34.55 KG/M2 | OXYGEN SATURATION: 96 %

## 2021-10-26 DIAGNOSIS — H65.02 NON-RECURRENT ACUTE SEROUS OTITIS MEDIA OF LEFT EAR: Primary | ICD-10-CM

## 2021-10-26 PROCEDURE — 99213 OFFICE O/P EST LOW 20 MIN: CPT | Performed by: FAMILY MEDICINE

## 2021-10-26 PROCEDURE — 3008F BODY MASS INDEX DOCD: CPT | Performed by: FAMILY MEDICINE

## 2021-10-26 PROCEDURE — 3079F DIAST BP 80-89 MM HG: CPT | Performed by: FAMILY MEDICINE

## 2021-10-26 PROCEDURE — 3074F SYST BP LT 130 MM HG: CPT | Performed by: FAMILY MEDICINE

## 2021-10-26 NOTE — TELEPHONE ENCOUNTER
Pt was in ER yesterday for double ear infection and sinus inf-wants appt today or tomorrow-ok to put in w/MK or triage?

## 2021-10-26 NOTE — PROGRESS NOTES
Randa Graham  4/30/1980    Patient presents with:  Urgent Care F/u: RM 9 pt was in er for sinus infection and double ear infection      HPI:   Randa Graham is a 37year old female who presents for follow-up on her recent URI symptoms ENDOCERVICAL BIOPSY (G1B.1)  02/20/2010    [place], neg      Family History   Problem Relation Age of Onset   • Thyroid Disorder Sister    • High Blood Pressure Sister    • Lung Disorder Mother         Emphysema, COPD   • Diabetes Mother    • Uterine Cance MD

## 2021-10-26 NOTE — TELEPHONE ENCOUNTER
Future Appointments   Date Time Provider Neema Keller   10/26/2021 12:40 PM Jodie Reyes MD [facility] [address] [facility]   12/1/2021 11:40 AM Nila Mcdonnell MD [facility]

## 2021-10-26 NOTE — TELEPHONE ENCOUNTER
Per ER not 10/24/2021 body aches, chills, cough, ear pain, diarrhea. Rapid covid testing negative.  [address] ok to see in office today- patient scheduled at 12:40

## 2021-11-11 ENCOUNTER — HOSPITAL ENCOUNTER (EMERGENCY)
Age: 41
Discharge: HOME | End: 2021-11-11
Attending: EMERGENCY MEDICINE
Payer: MEDICAID

## 2021-11-11 ENCOUNTER — APPOINTMENT (OUTPATIENT)
Dept: CT IMAGING | Age: 41
End: 2021-11-11
Attending: EMERGENCY MEDICINE
Payer: MEDICAID

## 2021-11-11 ENCOUNTER — APPOINTMENT (OUTPATIENT)
Dept: ULTRASOUND IMAGING | Age: 41
End: 2021-11-11
Attending: EMERGENCY MEDICINE
Payer: MEDICAID

## 2021-11-11 VITALS
DIASTOLIC BLOOD PRESSURE: 79 MMHG | RESPIRATION RATE: 18 BRPM | WEIGHT: 209 LBS | BODY MASS INDEX: 35 KG/M2 | HEART RATE: 69 BPM | SYSTOLIC BLOOD PRESSURE: 110 MMHG | OXYGEN SATURATION: 98 % | TEMPERATURE: 98 F

## 2021-11-11 DIAGNOSIS — N83.201 CYSTS OF BOTH OVARIES: Primary | ICD-10-CM

## 2021-11-11 DIAGNOSIS — N83.202 CYSTS OF BOTH OVARIES: Primary | ICD-10-CM

## 2021-11-11 PROCEDURE — 87086 URINE CULTURE/COLONY COUNT: CPT | Performed by: EMERGENCY MEDICINE

## 2021-11-11 PROCEDURE — 83690 ASSAY OF LIPASE: CPT | Performed by: EMERGENCY MEDICINE

## 2021-11-11 PROCEDURE — 99285 EMERGENCY DEPT VISIT HI MDM: CPT

## 2021-11-11 PROCEDURE — 93975 VASCULAR STUDY: CPT | Performed by: EMERGENCY MEDICINE

## 2021-11-11 PROCEDURE — 96374 THER/PROPH/DIAG INJ IV PUSH: CPT

## 2021-11-11 PROCEDURE — 96375 TX/PRO/DX INJ NEW DRUG ADDON: CPT

## 2021-11-11 PROCEDURE — 85025 COMPLETE CBC W/AUTO DIFF WBC: CPT | Performed by: EMERGENCY MEDICINE

## 2021-11-11 PROCEDURE — 76830 TRANSVAGINAL US NON-OB: CPT | Performed by: EMERGENCY MEDICINE

## 2021-11-11 PROCEDURE — 81015 MICROSCOPIC EXAM OF URINE: CPT | Performed by: EMERGENCY MEDICINE

## 2021-11-11 PROCEDURE — 96361 HYDRATE IV INFUSION ADD-ON: CPT

## 2021-11-11 PROCEDURE — 80053 COMPREHEN METABOLIC PANEL: CPT | Performed by: EMERGENCY MEDICINE

## 2021-11-11 PROCEDURE — 81001 URINALYSIS AUTO W/SCOPE: CPT | Performed by: EMERGENCY MEDICINE

## 2021-11-11 PROCEDURE — 76856 US EXAM PELVIC COMPLETE: CPT | Performed by: EMERGENCY MEDICINE

## 2021-11-11 PROCEDURE — 74176 CT ABD & PELVIS W/O CONTRAST: CPT | Performed by: EMERGENCY MEDICINE

## 2021-11-11 RX ORDER — KETOROLAC TROMETHAMINE 15 MG/ML
15 INJECTION, SOLUTION INTRAMUSCULAR; INTRAVENOUS ONCE
Status: DISCONTINUED | OUTPATIENT
Start: 2021-11-11 | End: 2021-11-11

## 2021-11-11 RX ORDER — MORPHINE SULFATE 4 MG/ML
4 INJECTION, SOLUTION INTRAMUSCULAR; INTRAVENOUS ONCE
Status: COMPLETED | OUTPATIENT
Start: 2021-11-11 | End: 2021-11-11

## 2021-11-11 RX ORDER — ONDANSETRON 2 MG/ML
4 INJECTION INTRAMUSCULAR; INTRAVENOUS ONCE
Status: COMPLETED | OUTPATIENT
Start: 2021-11-11 | End: 2021-11-11

## 2021-11-11 RX ORDER — KETOROLAC TROMETHAMINE 30 MG/ML
30 INJECTION, SOLUTION INTRAMUSCULAR; INTRAVENOUS ONCE
Status: DISCONTINUED | OUTPATIENT
Start: 2021-11-11 | End: 2021-11-11

## 2021-11-11 RX ORDER — HYDROCODONE BITARTRATE AND ACETAMINOPHEN 5; 325 MG/1; MG/1
2 TABLET ORAL ONCE
Status: COMPLETED | OUTPATIENT
Start: 2021-11-11 | End: 2021-11-11

## 2021-11-11 RX ORDER — HYDROCODONE BITARTRATE AND ACETAMINOPHEN 5; 325 MG/1; MG/1
1-2 TABLET ORAL EVERY 6 HOURS PRN
Qty: 12 TABLET | Refills: 0 | Status: SHIPPED | OUTPATIENT
Start: 2021-11-11 | End: 2021-11-18

## 2021-11-12 NOTE — ED PROVIDER NOTES
Patient Seen in: THE [facility] Emergency Department In [place]      History   Patient presents with:  Abdomen/Flank Pain    Stated Complaint: abdominal pain    Subjective:   HPI    26-year-old woman here with severe left lower quadrant pain, started this mor Exam     ED Triage Vitals [11/11/21 1820]   /80   Pulse 87   Resp 20   Temp 98.3 °F (36.8 °C)   Temp src Temporal   SpO2 98 %   O2 Device None (Room air)       Current:/62   Pulse 72   Temp 98.3 °F (36.8 °C) (Temporal)   Resp 20   Wt 94.8 kg -----------         ------                     CBC W/ DIFFERENTIAL[[id number]]          Abnormal            Final result                 Please view results for these tests on the individual orders.    URINE CULTURE, ROUTINE          CT ABDOME urinary tract calculus or acute abdominal or pelvic pathology.    Dictated by (CST): Najma Guerrero MD on 11/11/2021 at 8:26 PM     Finalized by (CST): Najma Guerrero MD on 11/11/2021 at 8:31 PM       XR CHEST AP PORTABLE  (CPT=71045)    Result Date: 10/24/20 day  Follow-up with your primary doctor as well as your gynecologist within the next 1 to 2 days.     THE [facility] Emergency Department in [facility] [address] [phone]    As needed, If symptoms worsen          Medicati

## 2022-01-12 ENCOUNTER — OFFICE VISIT (OUTPATIENT)
Dept: ORTHOPEDICS CLINIC | Facility: CLINIC | Age: 42
End: 2022-01-12
Payer: MEDICAID

## 2022-01-12 VITALS — HEART RATE: 77 BPM | HEIGHT: 65.5 IN | WEIGHT: 211 LBS | BODY MASS INDEX: 34.73 KG/M2 | OXYGEN SATURATION: 99 %

## 2022-01-12 DIAGNOSIS — M76.899 TENDINITIS INVOLVING HIP ABDUCTORS, UNSPECIFIED LATERALITY: Primary | ICD-10-CM

## 2022-01-12 PROCEDURE — 3008F BODY MASS INDEX DOCD: CPT | Performed by: ORTHOPAEDIC SURGERY

## 2022-01-12 PROCEDURE — 99213 OFFICE O/P EST LOW 20 MIN: CPT | Performed by: ORTHOPAEDIC SURGERY

## 2022-01-12 NOTE — PROGRESS NOTES
[facility] Progress Note    Subjective: Patient returns to clinic for both hips.   She states that since the last visit, she did obtain custom orthoses from Dr. Freddie Rodgers, notes that this has made a tremendous difference and she no longer has any signifi

## 2022-01-15 ENCOUNTER — HOSPITAL ENCOUNTER (EMERGENCY)
Age: 42
Discharge: HOME | End: 2022-01-15
Attending: EMERGENCY MEDICINE
Payer: MEDICAID

## 2022-01-15 VITALS
BODY MASS INDEX: 34.99 KG/M2 | TEMPERATURE: 98 F | HEIGHT: 65 IN | WEIGHT: 210 LBS | DIASTOLIC BLOOD PRESSURE: 72 MMHG | OXYGEN SATURATION: 96 % | RESPIRATION RATE: 20 BRPM | SYSTOLIC BLOOD PRESSURE: 113 MMHG | HEART RATE: 89 BPM

## 2022-01-15 DIAGNOSIS — H15.103 EPISCLERITIS OF BOTH EYES: Primary | ICD-10-CM

## 2022-01-15 LAB — SARS-COV-2 RNA RESP QL NAA+PROBE: NOT DETECTED

## 2022-01-15 PROCEDURE — 99283 EMERGENCY DEPT VISIT LOW MDM: CPT

## 2022-01-15 RX ORDER — HYDROCODONE BITARTRATE AND ACETAMINOPHEN 5; 325 MG/1; MG/1
1 TABLET ORAL ONCE
Status: COMPLETED | OUTPATIENT
Start: 2022-01-15 | End: 2022-01-15

## 2022-01-15 RX ORDER — TETRACAINE HYDROCHLORIDE 5 MG/ML
1 SOLUTION OPHTHALMIC ONCE
Status: COMPLETED | OUTPATIENT
Start: 2022-01-15 | End: 2022-01-15

## 2022-01-15 RX ORDER — TOBRAMYCIN AND DEXAMETHASONE 3; 1 MG/ML; MG/ML
1 SUSPENSION/ DROPS OPHTHALMIC ONCE
Status: COMPLETED | OUTPATIENT
Start: 2022-01-15 | End: 2022-01-15

## 2022-01-16 NOTE — ED PROVIDER NOTES
Patient Seen in: THE [facility]ICAL [facility] Emergency Department In [place]      History   Patient presents with:   Eye Visual Problem    Stated Complaint: eye burning- no injury    Subjective:   HPI  Bilateral eye burning and pain after trying to place her contacts in th Smoking status: Current Every Day Smoker        Packs/day: 0.50        Years: 25.00        Pack years: 12.5        Types: Cigarettes      Smokeless tobacco: Never Used    Vaping Use      Vaping Use: Never used    Alcohol use: Yes      Comment: very rarel does seem to be either iritis or episcleritis, no visual corneal ulcers so we will start her on some TobraDex, she was given 1 Norco for pain and asked to ice or ice tonight, she can follow-up with ophthalmology.                               Disposition an

## 2022-03-08 ENCOUNTER — TELEPHONE (OUTPATIENT)
Dept: INTERNAL MEDICINE CLINIC | Facility: CLINIC | Age: 42
End: 2022-03-08

## 2022-03-08 NOTE — TELEPHONE ENCOUNTER
Future Appointments   Date Time Provider Neema Arianna   3/9/2022  9:00 AM Irving Greenwood MD [facility]     Pt sched herself for \"Circulation and sharp pain in abdomen\" please triage

## 2022-03-09 ENCOUNTER — OFFICE VISIT (OUTPATIENT)
Dept: INTERNAL MEDICINE CLINIC | Facility: CLINIC | Age: 42
End: 2022-03-09
Payer: MEDICAID

## 2022-03-09 VITALS
TEMPERATURE: 98 F | RESPIRATION RATE: 18 BRPM | HEART RATE: 60 BPM | DIASTOLIC BLOOD PRESSURE: 66 MMHG | BODY MASS INDEX: 35.65 KG/M2 | SYSTOLIC BLOOD PRESSURE: 118 MMHG | HEIGHT: 65 IN | OXYGEN SATURATION: 97 % | WEIGHT: 214 LBS

## 2022-03-09 DIAGNOSIS — F17.200 TOBACCO DEPENDENCE: ICD-10-CM

## 2022-03-09 DIAGNOSIS — E66.9 CLASS 2 OBESITY: ICD-10-CM

## 2022-03-09 DIAGNOSIS — R60.9 FLUID RETENTION: ICD-10-CM

## 2022-03-09 DIAGNOSIS — J45.21 MILD INTERMITTENT ASTHMA WITH ACUTE EXACERBATION: ICD-10-CM

## 2022-03-09 DIAGNOSIS — R25.2 LEG CRAMPING: Primary | ICD-10-CM

## 2022-03-09 PROBLEM — E66.812 CLASS 2 OBESITY: Status: ACTIVE | Noted: 2022-03-09

## 2022-03-09 PROCEDURE — 3008F BODY MASS INDEX DOCD: CPT | Performed by: INTERNAL MEDICINE

## 2022-03-09 PROCEDURE — 90471 IMMUNIZATION ADMIN: CPT | Performed by: INTERNAL MEDICINE

## 2022-03-09 PROCEDURE — 90732 PPSV23 VACC 2 YRS+ SUBQ/IM: CPT | Performed by: INTERNAL MEDICINE

## 2022-03-09 PROCEDURE — 3078F DIAST BP <80 MM HG: CPT | Performed by: INTERNAL MEDICINE

## 2022-03-09 PROCEDURE — 99214 OFFICE O/P EST MOD 30 MIN: CPT | Performed by: INTERNAL MEDICINE

## 2022-03-09 PROCEDURE — 3074F SYST BP LT 130 MM HG: CPT | Performed by: INTERNAL MEDICINE

## 2022-03-10 LAB
ABSOLUTE BASOPHILS: 38 CELLS/UL (ref 0–200)
ABSOLUTE EOSINOPHILS: 258 CELLS/UL (ref 15–500)
ABSOLUTE LYMPHOCYTES: 1852 CELLS/UL (ref 850–3900)
ABSOLUTE MONOCYTES: 510 CELLS/UL (ref 200–950)
ABSOLUTE NEUTROPHILS: 3641 CELLS/UL (ref 1500–7800)
ALBUMIN/GLOBULIN RATIO: 1.6 (CALC) (ref 1–2.5)
ALBUMIN: 4.1 G/DL (ref 3.6–5.1)
ALKALINE PHOSPHATASE: 62 U/L (ref 31–125)
ALT: 17 U/L (ref 6–29)
AST: 15 U/L (ref 10–30)
BASOPHILS: 0.6 %
BILIRUBIN, TOTAL: 0.4 MG/DL (ref 0.2–1.2)
BUN: 9 MG/DL (ref 7–25)
CALCIUM: 9.1 MG/DL (ref 8.6–10.2)
CARBON DIOXIDE: 25 MMOL/L (ref 20–32)
CHLORIDE: 107 MMOL/L (ref 98–110)
CREATININE: 0.69 MG/DL (ref 0.5–1.1)
D-DIMER, QUANTITATIVE: 0.37 MCG/ML FEU
EGFR IF NONAFRICN AM: 109 ML/MIN/1.73M2
EOSINOPHILS: 4.1 %
GLOBULIN: 2.5 G/DL (CALC) (ref 1.9–3.7)
GLUCOSE: 84 MG/DL (ref 65–99)
HEMATOCRIT: 40.1 % (ref 35–45)
HEMOGLOBIN: 13.6 G/DL (ref 11.7–15.5)
LYMPHOCYTES: 29.4 %
MCH: 30 PG (ref 27–33)
MCHC: 33.9 G/DL (ref 32–36)
MCV: 88.3 FL (ref 80–100)
MONOCYTES: 8.1 %
MPV: 9.7 FL (ref 7.5–12.5)
NEUTROPHILS: 57.8 %
PLATELET COUNT: 367 THOUSAND/UL (ref 140–400)
POTASSIUM: 4.4 MMOL/L (ref 3.5–5.3)
PROTEIN, TOTAL: 6.6 G/DL (ref 6.1–8.1)
RDW: 12.7 % (ref 11–15)
RED BLOOD CELL COUNT: 4.54 MILLION/UL (ref 3.8–5.1)
SODIUM: 138 MMOL/L (ref 135–146)
TSH W/REFLEX TO FT4: 1.22 MIU/L
WHITE BLOOD CELL COUNT: 6.3 THOUSAND/UL (ref 3.8–10.8)

## 2022-04-20 ENCOUNTER — OFFICE VISIT (OUTPATIENT)
Dept: ORTHOPEDICS CLINIC | Facility: CLINIC | Age: 42
End: 2022-04-20
Payer: MEDICAID

## 2022-04-20 VITALS — BODY MASS INDEX: 35.82 KG/M2 | HEIGHT: 65 IN | WEIGHT: 215 LBS

## 2022-04-20 DIAGNOSIS — M79.671 PAIN IN BOTH FEET: Primary | ICD-10-CM

## 2022-04-20 DIAGNOSIS — M21.079 ACQUIRED VALGUS DEFORMITY OF ANKLE: ICD-10-CM

## 2022-04-20 DIAGNOSIS — M79.672 PAIN IN BOTH FEET: Primary | ICD-10-CM

## 2022-04-20 DIAGNOSIS — M77.9 TENDONITIS: ICD-10-CM

## 2022-04-20 PROCEDURE — 99213 OFFICE O/P EST LOW 20 MIN: CPT | Performed by: PODIATRIST

## 2022-04-20 PROCEDURE — 3008F BODY MASS INDEX DOCD: CPT | Performed by: PODIATRIST

## 2022-04-20 NOTE — PROGRESS NOTES
[facility] Progress Note    Subjective:     Pt is here for long term of orthotics for tendinitis of the posterior tibial tendon. Back in November she did get the orthotics and she slowly got used to them and they were pretty helpful for her overall she feels less issues with her feet and her hips        Objective:     Exam much less swelling today less tenderness about the medial ankles, flexible flatfoot upon stance. Varicosities bilateral              Assessment/Plan:     Diagnoses and all orders for this visit:    Pain in both feet    Tendonitis    Acquired valgus deformity of ankle        Recommend using compression socks at work. Continue with the good orthotics and good shoes. Yearly she should get new orthotics as these are already starting to get a little worn from constant use day and day out. She can simply call or message through [employer] when she is ready to go ahead with a new pair. Shade Sweeney. Claude Hercules DPM  [facility]    [employer] speech recognition software was used to prepare this note. If a word or phrase is confusing, it is likely do to a failure of recognition. Please contact me with any questions or clarifications.

## 2022-07-12 ENCOUNTER — LABORATORY ENCOUNTER (OUTPATIENT)
Dept: LAB | Age: 42
End: 2022-07-12
Attending: OBSTETRICS & GYNECOLOGY
Payer: MEDICAID

## 2022-07-12 ENCOUNTER — LAB ENCOUNTER (OUTPATIENT)
Dept: LAB | Age: 42
End: 2022-07-12
Attending: OBSTETRICS & GYNECOLOGY
Payer: MEDICAID

## 2022-07-12 DIAGNOSIS — R10.2 PELVIC PAIN: ICD-10-CM

## 2022-07-12 LAB
ANTIBODY SCREEN: NEGATIVE
BASOPHILS # BLD AUTO: 0.04 X10(3) UL (ref 0–0.2)
BASOPHILS NFR BLD AUTO: 0.7 %
EOSINOPHIL # BLD AUTO: 0.22 X10(3) UL (ref 0–0.7)
EOSINOPHIL NFR BLD AUTO: 4 %
ERYTHROCYTE [DISTWIDTH] IN BLOOD BY AUTOMATED COUNT: 12.5 %
HCT VFR BLD AUTO: 42.1 %
HGB BLD-MCNC: 14.1 G/DL
IMM GRANULOCYTES # BLD AUTO: 0.04 X10(3) UL (ref 0–1)
IMM GRANULOCYTES NFR BLD: 0.7 %
LYMPHOCYTES # BLD AUTO: 2.18 X10(3) UL (ref 1–4)
LYMPHOCYTES NFR BLD AUTO: 39.6 %
MCH RBC QN AUTO: 30.1 PG (ref 26–34)
MCHC RBC AUTO-ENTMCNC: 33.5 G/DL (ref 31–37)
MCV RBC AUTO: 89.8 FL
MONOCYTES # BLD AUTO: 0.46 X10(3) UL (ref 0.1–1)
MONOCYTES NFR BLD AUTO: 8.3 %
NEUTROPHILS # BLD AUTO: 2.57 X10 (3) UL (ref 1.5–7.7)
NEUTROPHILS # BLD AUTO: 2.57 X10(3) UL (ref 1.5–7.7)
NEUTROPHILS NFR BLD AUTO: 46.7 %
PLATELET # BLD AUTO: 322 10(3)UL (ref 150–450)
RBC # BLD AUTO: 4.69 X10(6)UL
RH BLOOD TYPE: NEGATIVE
WBC # BLD AUTO: 5.5 X10(3) UL (ref 4–11)

## 2022-07-12 PROCEDURE — 85025 COMPLETE CBC W/AUTO DIFF WBC: CPT

## 2022-07-12 PROCEDURE — 86850 RBC ANTIBODY SCREEN: CPT

## 2022-07-12 PROCEDURE — 86900 BLOOD TYPING SEROLOGIC ABO: CPT

## 2022-07-12 PROCEDURE — 86901 BLOOD TYPING SEROLOGIC RH(D): CPT

## 2022-07-12 PROCEDURE — 36415 COLL VENOUS BLD VENIPUNCTURE: CPT

## 2022-07-13 LAB — SARS-COV-2 RNA RESP QL NAA+PROBE: NOT DETECTED

## 2022-07-14 ENCOUNTER — ANESTHESIA (OUTPATIENT)
Dept: SURGERY | Facility: HOSPITAL | Age: 42
End: 2022-07-14
Payer: MEDICAID

## 2022-07-14 ENCOUNTER — HOSPITAL ENCOUNTER (OUTPATIENT)
Facility: HOSPITAL | Age: 42
Setting detail: HOSPITAL OUTPATIENT SURGERY
Discharge: HOME | End: 2022-07-14
Attending: OBSTETRICS & GYNECOLOGY | Admitting: OBSTETRICS & GYNECOLOGY
Payer: MEDICAID

## 2022-07-14 ENCOUNTER — ANESTHESIA EVENT (OUTPATIENT)
Dept: SURGERY | Facility: HOSPITAL | Age: 42
End: 2022-07-14
Payer: MEDICAID

## 2022-07-14 VITALS
RESPIRATION RATE: 18 BRPM | OXYGEN SATURATION: 100 % | WEIGHT: 218.94 LBS | SYSTOLIC BLOOD PRESSURE: 127 MMHG | TEMPERATURE: 98 F | DIASTOLIC BLOOD PRESSURE: 82 MMHG | BODY MASS INDEX: 36.48 KG/M2 | HEART RATE: 70 BPM | HEIGHT: 65 IN

## 2022-07-14 DIAGNOSIS — R10.2 PELVIC PAIN: ICD-10-CM

## 2022-07-14 DIAGNOSIS — Z01.812 ENCOUNTER FOR PREOPERATIVE SCREENING LABORATORY TESTING FOR COVID-19 VIRUS: Primary | ICD-10-CM

## 2022-07-14 DIAGNOSIS — Z20.822 ENCOUNTER FOR PREOPERATIVE SCREENING LABORATORY TESTING FOR COVID-19 VIRUS: Primary | ICD-10-CM

## 2022-07-14 LAB — B-HCG UR QL: NEGATIVE

## 2022-07-14 PROCEDURE — 81025 URINE PREGNANCY TEST: CPT

## 2022-07-14 PROCEDURE — 0UT94ZZ RESECTION OF UTERUS, PERCUTANEOUS ENDOSCOPIC APPROACH: ICD-10-PCS | Performed by: OBSTETRICS & GYNECOLOGY

## 2022-07-14 PROCEDURE — 88307 TISSUE EXAM BY PATHOLOGIST: CPT | Performed by: OBSTETRICS & GYNECOLOGY

## 2022-07-14 PROCEDURE — 0UT74ZZ RESECTION OF BILATERAL FALLOPIAN TUBES, PERCUTANEOUS ENDOSCOPIC APPROACH: ICD-10-PCS | Performed by: OBSTETRICS & GYNECOLOGY

## 2022-07-14 RX ORDER — KETOROLAC TROMETHAMINE 30 MG/ML
INJECTION, SOLUTION INTRAMUSCULAR; INTRAVENOUS AS NEEDED
Status: DISCONTINUED | OUTPATIENT
Start: 2022-07-14 | End: 2022-07-14 | Stop reason: SURG

## 2022-07-14 RX ORDER — SODIUM CHLORIDE, SODIUM LACTATE, POTASSIUM CHLORIDE, CALCIUM CHLORIDE 600; 310; 30; 20 MG/100ML; MG/100ML; MG/100ML; MG/100ML
INJECTION, SOLUTION INTRAVENOUS CONTINUOUS
Status: DISCONTINUED | OUTPATIENT
Start: 2022-07-14 | End: 2022-07-14

## 2022-07-14 RX ORDER — ALBUTEROL SULFATE 2.5 MG/3ML
2.5 SOLUTION RESPIRATORY (INHALATION) AS NEEDED
Status: DISCONTINUED | OUTPATIENT
Start: 2022-07-14 | End: 2022-07-14

## 2022-07-14 RX ORDER — MIDAZOLAM HYDROCHLORIDE 1 MG/ML
INJECTION INTRAMUSCULAR; INTRAVENOUS AS NEEDED
Status: DISCONTINUED | OUTPATIENT
Start: 2022-07-14 | End: 2022-07-14 | Stop reason: SURG

## 2022-07-14 RX ORDER — CEFAZOLIN SODIUM/WATER 2 G/20 ML
SYRINGE (ML) INTRAVENOUS
Status: DISCONTINUED
Start: 2022-07-14 | End: 2022-07-14

## 2022-07-14 RX ORDER — HYDROMORPHONE HYDROCHLORIDE 1 MG/ML
0.4 INJECTION, SOLUTION INTRAMUSCULAR; INTRAVENOUS; SUBCUTANEOUS EVERY 5 MIN PRN
Status: DISCONTINUED | OUTPATIENT
Start: 2022-07-14 | End: 2022-07-14

## 2022-07-14 RX ORDER — ROCURONIUM BROMIDE 10 MG/ML
INJECTION, SOLUTION INTRAVENOUS AS NEEDED
Status: DISCONTINUED | OUTPATIENT
Start: 2022-07-14 | End: 2022-07-14 | Stop reason: SURG

## 2022-07-14 RX ORDER — NALOXONE HYDROCHLORIDE 0.4 MG/ML
80 INJECTION, SOLUTION INTRAMUSCULAR; INTRAVENOUS; SUBCUTANEOUS AS NEEDED
Status: DISCONTINUED | OUTPATIENT
Start: 2022-07-14 | End: 2022-07-14

## 2022-07-14 RX ORDER — LIDOCAINE HYDROCHLORIDE 10 MG/ML
INJECTION, SOLUTION EPIDURAL; INFILTRATION; INTRACAUDAL; PERINEURAL AS NEEDED
Status: DISCONTINUED | OUTPATIENT
Start: 2022-07-14 | End: 2022-07-14 | Stop reason: SURG

## 2022-07-14 RX ORDER — HYDROCODONE BITARTRATE AND ACETAMINOPHEN 5; 325 MG/1; MG/1
1 TABLET ORAL ONCE AS NEEDED
Status: COMPLETED | OUTPATIENT
Start: 2022-07-14 | End: 2022-07-14

## 2022-07-14 RX ORDER — DEXAMETHASONE SODIUM PHOSPHATE 4 MG/ML
VIAL (ML) INJECTION AS NEEDED
Status: DISCONTINUED | OUTPATIENT
Start: 2022-07-14 | End: 2022-07-14 | Stop reason: SURG

## 2022-07-14 RX ORDER — BUPIVACAINE HYDROCHLORIDE 5 MG/ML
INJECTION, SOLUTION EPIDURAL; INTRACAUDAL AS NEEDED
Status: DISCONTINUED | OUTPATIENT
Start: 2022-07-14 | End: 2022-07-14 | Stop reason: HOSPADM

## 2022-07-14 RX ORDER — PROCHLORPERAZINE EDISYLATE 5 MG/ML
5 INJECTION INTRAMUSCULAR; INTRAVENOUS EVERY 8 HOURS PRN
Status: DISCONTINUED | OUTPATIENT
Start: 2022-07-14 | End: 2022-07-14

## 2022-07-14 RX ORDER — ONDANSETRON 2 MG/ML
INJECTION INTRAMUSCULAR; INTRAVENOUS
Status: COMPLETED
Start: 2022-07-14 | End: 2022-07-14

## 2022-07-14 RX ORDER — ACETAMINOPHEN 500 MG
1000 TABLET ORAL ONCE AS NEEDED
Status: COMPLETED | OUTPATIENT
Start: 2022-07-14 | End: 2022-07-14

## 2022-07-14 RX ORDER — GLYCOPYRROLATE 0.2 MG/ML
INJECTION, SOLUTION INTRAMUSCULAR; INTRAVENOUS AS NEEDED
Status: DISCONTINUED | OUTPATIENT
Start: 2022-07-14 | End: 2022-07-14 | Stop reason: SURG

## 2022-07-14 RX ORDER — NEOSTIGMINE METHYLSULFATE 1 MG/ML
INJECTION, SOLUTION INTRAVENOUS AS NEEDED
Status: DISCONTINUED | OUTPATIENT
Start: 2022-07-14 | End: 2022-07-14 | Stop reason: SURG

## 2022-07-14 RX ORDER — ONDANSETRON 2 MG/ML
INJECTION INTRAMUSCULAR; INTRAVENOUS AS NEEDED
Status: DISCONTINUED | OUTPATIENT
Start: 2022-07-14 | End: 2022-07-14 | Stop reason: SURG

## 2022-07-14 RX ORDER — LABETALOL HYDROCHLORIDE 5 MG/ML
INJECTION, SOLUTION INTRAVENOUS AS NEEDED
Status: DISCONTINUED | OUTPATIENT
Start: 2022-07-14 | End: 2022-07-14 | Stop reason: SURG

## 2022-07-14 RX ORDER — MEPERIDINE HYDROCHLORIDE 25 MG/ML
12.5 INJECTION INTRAMUSCULAR; INTRAVENOUS; SUBCUTANEOUS AS NEEDED
Status: DISCONTINUED | OUTPATIENT
Start: 2022-07-14 | End: 2022-07-14

## 2022-07-14 RX ORDER — HYDROMORPHONE HYDROCHLORIDE 1 MG/ML
INJECTION, SOLUTION INTRAMUSCULAR; INTRAVENOUS; SUBCUTANEOUS
Status: COMPLETED
Start: 2022-07-14 | End: 2022-07-14

## 2022-07-14 RX ORDER — ACETAMINOPHEN 500 MG
1000 TABLET ORAL ONCE
Status: DISCONTINUED | OUTPATIENT
Start: 2022-07-14 | End: 2022-07-14 | Stop reason: HOSPADM

## 2022-07-14 RX ORDER — ONDANSETRON 2 MG/ML
4 INJECTION INTRAMUSCULAR; INTRAVENOUS EVERY 6 HOURS PRN
Status: DISCONTINUED | OUTPATIENT
Start: 2022-07-14 | End: 2022-07-14

## 2022-07-14 RX ORDER — SCOLOPAMINE TRANSDERMAL SYSTEM 1 MG/1
1 PATCH, EXTENDED RELEASE TRANSDERMAL ONCE
Status: DISCONTINUED | OUTPATIENT
Start: 2022-07-14 | End: 2022-07-14 | Stop reason: HOSPADM

## 2022-07-14 RX ORDER — HYDROCODONE BITARTRATE AND ACETAMINOPHEN 10; 325 MG/1; MG/1
1-2 TABLET ORAL EVERY 4 HOURS PRN
Qty: 15 TABLET | Refills: 0 | Status: SHIPPED | OUTPATIENT
Start: 2022-07-14

## 2022-07-14 RX ORDER — CEFAZOLIN SODIUM/WATER 2 G/20 ML
2 SYRINGE (ML) INTRAVENOUS ONCE
Status: COMPLETED | OUTPATIENT
Start: 2022-07-14 | End: 2022-07-14

## 2022-07-14 RX ORDER — HYDROMORPHONE HYDROCHLORIDE 1 MG/ML
0.6 INJECTION, SOLUTION INTRAMUSCULAR; INTRAVENOUS; SUBCUTANEOUS EVERY 5 MIN PRN
Status: DISCONTINUED | OUTPATIENT
Start: 2022-07-14 | End: 2022-07-14

## 2022-07-14 RX ORDER — HYDROMORPHONE HYDROCHLORIDE 1 MG/ML
0.2 INJECTION, SOLUTION INTRAMUSCULAR; INTRAVENOUS; SUBCUTANEOUS EVERY 5 MIN PRN
Status: DISCONTINUED | OUTPATIENT
Start: 2022-07-14 | End: 2022-07-14

## 2022-07-14 RX ORDER — HYDROCODONE BITARTRATE AND ACETAMINOPHEN 5; 325 MG/1; MG/1
2 TABLET ORAL ONCE AS NEEDED
Status: COMPLETED | OUTPATIENT
Start: 2022-07-14 | End: 2022-07-14

## 2022-07-14 RX ORDER — KETAMINE HYDROCHLORIDE 50 MG/ML
INJECTION, SOLUTION, CONCENTRATE INTRAMUSCULAR; INTRAVENOUS AS NEEDED
Status: DISCONTINUED | OUTPATIENT
Start: 2022-07-14 | End: 2022-07-14 | Stop reason: SURG

## 2022-07-14 RX ADMIN — KETOROLAC TROMETHAMINE 30 MG: 30 INJECTION, SOLUTION INTRAMUSCULAR; INTRAVENOUS at 11:44:00

## 2022-07-14 RX ADMIN — SODIUM CHLORIDE, SODIUM LACTATE, POTASSIUM CHLORIDE, CALCIUM CHLORIDE: 600; 310; 30; 20 INJECTION, SOLUTION INTRAVENOUS at 10:41:00

## 2022-07-14 RX ADMIN — KETAMINE HYDROCHLORIDE 25 MG: 50 INJECTION, SOLUTION, CONCENTRATE INTRAMUSCULAR; INTRAVENOUS at 10:07:00

## 2022-07-14 RX ADMIN — LIDOCAINE HYDROCHLORIDE 50 MG: 10 INJECTION, SOLUTION EPIDURAL; INFILTRATION; INTRACAUDAL; PERINEURAL at 10:03:00

## 2022-07-14 RX ADMIN — GLYCOPYRROLATE 0.4 MG: 0.2 INJECTION, SOLUTION INTRAMUSCULAR; INTRAVENOUS at 11:50:00

## 2022-07-14 RX ADMIN — MIDAZOLAM HYDROCHLORIDE 2 MG: 1 INJECTION INTRAMUSCULAR; INTRAVENOUS at 09:59:00

## 2022-07-14 RX ADMIN — ROCURONIUM BROMIDE 5 MG: 10 INJECTION, SOLUTION INTRAVENOUS at 11:00:00

## 2022-07-14 RX ADMIN — LABETALOL HYDROCHLORIDE 5 MG: 5 INJECTION, SOLUTION INTRAVENOUS at 10:32:00

## 2022-07-14 RX ADMIN — ROCURONIUM BROMIDE 50 MG: 10 INJECTION, SOLUTION INTRAVENOUS at 10:03:00

## 2022-07-14 RX ADMIN — SODIUM CHLORIDE, SODIUM LACTATE, POTASSIUM CHLORIDE, CALCIUM CHLORIDE: 600; 310; 30; 20 INJECTION, SOLUTION INTRAVENOUS at 09:59:00

## 2022-07-14 RX ADMIN — DEXAMETHASONE SODIUM PHOSPHATE 4 MG: 4 MG/ML VIAL (ML) INJECTION at 10:03:00

## 2022-07-14 RX ADMIN — NEOSTIGMINE METHYLSULFATE 3 MG: 1 INJECTION, SOLUTION INTRAVENOUS at 11:50:00

## 2022-07-14 RX ADMIN — CEFAZOLIN SODIUM/WATER 2 G: 2 G/20 ML SYRINGE (ML) INTRAVENOUS at 10:07:00

## 2022-07-14 RX ADMIN — ONDANSETRON 4 MG: 2 INJECTION INTRAMUSCULAR; INTRAVENOUS at 11:44:00

## 2022-07-14 NOTE — OPERATIVE REPORT
Operative Note    Preop diagnosis: Pelvic pain     Menorrhagia     Abn uterine bleeding  Postop diagnosis: Same  Procedure:  TLH with vaginal cuff closure, bilateral salpingectomy, lysis of adhesion  Surgeon:  Lovely Hernandez  Assistant:  Nancy Jain  Anesthesia:  GETT  Findings:  Exam under anesthesia notes AV uterus sounding to 10 cm, multiparous cervix     Laparoscopic evaluation notes AV uterus, NL fallopian tubes, NL ovaries bilaterally, filmy adhesions of L ovary to pelvic sidewall  Specimens:  Uterus with cervix, bilateral fallopian tubes  EBL:   261 cc  Complications:  None  Disposition:  Recovery room in stable condition. Procedure:   After assuring informed consent, the patient was taken to the operating room where anesthesia was administered and found to be adequate. She was placed in the dorsal lithotomy position with Carlos A stirrups. The abdomen, perineum and vagina were prepped and draped in the usual sterile fashion. The bladder was emptied with a garcia catheter to gravity. The patient was examined with the findings as noted above. A retractor was placed posteriorly and the anterior lip of  the cervix was grasped with a tenaculum. The uterus sounded to 10 cm and a [employer] uterine manipulator was advanced through the endocervical canal. The 3.5 cm cup was advanced to over the cervix. The umbilicus was grasped with 2 towel clips and marcaine was injected into the umbilicus. A 5 mm vertical infraumbilical incision was made. The Verres needle was inserted through the incision while tenting up on the abdomen. Saline syringe test was within normal limits. The intra-abdominal pressure confirmed intra-abdominal placement of the verres needle. The abdomen was insufflated with CO2 until 15 mm of pressure was confirmed. The Verres needle was removed and a 5 mm tocar was placed through the incision. The laparoscope was introduced and safe entry in the peritoneal cavity was confirmed.  The pelvis and upper abdomen were examined with the findings as noted above. The inferior epigastric vessels were identified bilaterally. Marcaine was injected lateral to the vessels. A 5 mm incision was made in the LLQ quadrant, and a 5 mm trocar placed through this incision under direct visualization. This was repeated on the contralateral side in a similar manner. The ureters were identified bilaterally with peristalsis confirmed. The L adnexa was grasped and elevated allowing greater exposure of the ipsilateral infundibulopelvic ligament and ureter. The L fallopian tube was resected with the harmonic scalpel and removed from the surgical field. The L round ligament was cauterized and transected with the harmonic scalpel. The anterior and posterior leaves of the broad ligament were exposed. The dissection was taken anteriorly along the uterus to the level of the vesicouterine peritoneum. The vesicouterine peritoneum was transected and the bladder was dissected off the lower uterine segment and anterior cervix. The cervical cup from the uterine manipulator was appreciated superior and away from the bladder. The posterior peritoneum was dissected along the uterus to the level of the uterosacral ligaments. The uterine vessels were skeletonized, cauterized with the PK, and transected with the harmonic. The same procedure was performed on the R adnexa in a similar fashion. The uterine vessels were skeletonized, cauterized with the PK, and transected with the hramonic. Dissection was taken down through the fascia to expose the cervcal cup. A circumferential incision was made around the cup with the harmonic scalpel, freeing the uterus. The cervix was noted to be excised entirely. The uterus was easily delivered through the vagina. The uterus was brought through the vaginal cuff. Tenaculums were used for traction. The vaginal cuff angles were grasped with Allis clamps.  The cuff was closed with figure-of-eight sutures in a horizontal manner using 0-vicryl. Good hemostasis was confirmed at the cuff. The pelvis was reassessed laparoscopically to confirm hemostasis. Copious irrigation was performed. Gas was allowed to escape from the peritoneal cavity and instruments were removed. Skin incisions were closed with 3-0 monocryl. Surgical glue was placed over the incisions. All counts were correct times two. The patient tolerated the procedure well and was taken to the recovery room in stable condition. Dr. Wang Cee was present throughout the entire case and was critical in ensuring adequate exposure for patient safety and optimization of outcome. He/she actively manipulated the laparoscopic camera, assisted in retraction, uterine manipulation, exposure, hemostasis, entry/closure as well other essential operative technical functions.

## 2022-07-14 NOTE — PROGRESS NOTES
Addendum:  Pt with history of chronic pain. Will take norco 10/325, states she has no relief with norco 5/325. Pt discharge home with norco 10/325 #15.

## 2022-07-14 NOTE — ANESTHESIA PROCEDURE NOTES
Airway  Date/Time: 7/14/2022 10:05 AM  Urgency: elective    Airway not difficult    General Information and Staff    Patient location during procedure: OR  Anesthesiologist: Sherita Ram MD  Resident/CRNA: Chito Bernal CRNA  Performed: CRNA     Indications and Patient Condition  Indications for airway management: anesthesia  Spontaneous Ventilation: absent  Sedation level: deep  Preoxygenated: yes  Patient position: sniffing  Mask difficulty assessment: 1 - vent by mask    Final Airway Details  Final airway type: endotracheal airway      Successful airway: ETT  Cuffed: yes   Successful intubation technique: direct laryngoscopy  Endotracheal tube insertion site: oral  Blade: Yaz  Blade size: #3  ETT size (mm): 7.0    Cormack-Lehane Classification: grade I - full view of glottis  Placement verified by: chest auscultation and capnometry   Measured from: lips  ETT to lips (cm): 21  Number of attempts at approach: 1

## 2023-01-25 ENCOUNTER — OFFICE VISIT (OUTPATIENT)
Dept: ORTHOPEDICS CLINIC | Facility: CLINIC | Age: 43
End: 2023-01-25
Payer: MEDICAID

## 2023-01-25 VITALS — BODY MASS INDEX: 36.32 KG/M2 | HEIGHT: 65 IN | WEIGHT: 218 LBS

## 2023-01-25 DIAGNOSIS — F17.200 CURRENT EVERY DAY SMOKER: ICD-10-CM

## 2023-01-25 DIAGNOSIS — I83.813 VARICOSE VEINS OF BOTH LOWER EXTREMITIES WITH PAIN: ICD-10-CM

## 2023-01-25 DIAGNOSIS — R26.9 GAIT ABNORMALITY: ICD-10-CM

## 2023-01-25 DIAGNOSIS — M79.671 PAIN IN BOTH FEET: Primary | ICD-10-CM

## 2023-01-25 DIAGNOSIS — M79.672 PAIN IN BOTH FEET: Primary | ICD-10-CM

## 2023-01-25 DIAGNOSIS — M77.9 TENDONITIS: ICD-10-CM

## 2023-01-25 DIAGNOSIS — M21.079 ACQUIRED VALGUS DEFORMITY OF ANKLE: ICD-10-CM

## 2023-01-25 PROCEDURE — 3008F BODY MASS INDEX DOCD: CPT | Performed by: PODIATRIST

## 2023-01-25 PROCEDURE — 99213 OFFICE O/P EST LOW 20 MIN: CPT | Performed by: PODIATRIST

## 2023-01-25 NOTE — PROGRESS NOTES
[facility] Progress Note    Subjective:     Maggy Bond is here for follow-up of both ankles and other issues with her feet. Last year we had made her orthotics and she has worn them consistently for work on her feet, long hours as a . She thinks they are wearing out. Feels she is really compensating for foot and ankle pain        Objective: Ankle valgus bilateral, worse left  Flexible flat foot, wide foot  Knee valgum, high Q angle    Heel pain at PF insertion , plantar medial heel pain        Varicosities about the legs and medial ankle bilateral            Assessment/Plan:     Diagnoses and all orders for this visit:    Pain in both feet    Tendonitis    Acquired valgus deformity of ankle    Gait abnormality    Current every day smoker    Varicose veins of both lower extremities with pain        Compression socks recommended  New orthotics to order-will make these more with more of a heel cup and more correction for her  Will be sent to her home and follow-up if any issues or need to adjust.  Better shoes- discussed and gave types    Even switching shoes middle of shift  See her yearly        Devika Vanegas. Estelita Simons DPM  [facility]    [employer] speech recognition software was used to prepare this note. If a word or phrase is confusing, it is likely do to a failure of recognition. Please contact me with any questions or clarifications.

## 2023-07-20 ENCOUNTER — OFFICE VISIT (OUTPATIENT)
Dept: INTERNAL MEDICINE CLINIC | Facility: CLINIC | Age: 43
End: 2023-07-20
Payer: MEDICAID

## 2023-07-20 VITALS
OXYGEN SATURATION: 99 % | RESPIRATION RATE: 18 BRPM | SYSTOLIC BLOOD PRESSURE: 122 MMHG | WEIGHT: 232.19 LBS | DIASTOLIC BLOOD PRESSURE: 84 MMHG | HEART RATE: 99 BPM | BODY MASS INDEX: 38.69 KG/M2 | TEMPERATURE: 97 F | HEIGHT: 65 IN

## 2023-07-20 DIAGNOSIS — M79.673 CHRONIC FOOT PAIN, UNSPECIFIED LATERALITY: ICD-10-CM

## 2023-07-20 DIAGNOSIS — K21.9 GASTROESOPHAGEAL REFLUX DISEASE, UNSPECIFIED WHETHER ESOPHAGITIS PRESENT: Primary | ICD-10-CM

## 2023-07-20 DIAGNOSIS — Z87.891 HISTORY OF TOBACCO USE: ICD-10-CM

## 2023-07-20 DIAGNOSIS — M21.079 ACQUIRED VALGUS DEFORMITY OF ANKLE: ICD-10-CM

## 2023-07-20 DIAGNOSIS — G89.29 CHRONIC FOOT PAIN, UNSPECIFIED LATERALITY: ICD-10-CM

## 2023-07-20 DIAGNOSIS — E66.9 CLASS 2 OBESITY: ICD-10-CM

## 2023-07-20 PROCEDURE — 3008F BODY MASS INDEX DOCD: CPT | Performed by: INTERNAL MEDICINE

## 2023-07-20 PROCEDURE — 90677 PCV20 VACCINE IM: CPT | Performed by: INTERNAL MEDICINE

## 2023-07-20 PROCEDURE — 90471 IMMUNIZATION ADMIN: CPT | Performed by: INTERNAL MEDICINE

## 2023-07-20 PROCEDURE — 3074F SYST BP LT 130 MM HG: CPT | Performed by: INTERNAL MEDICINE

## 2023-07-20 PROCEDURE — 99214 OFFICE O/P EST MOD 30 MIN: CPT | Performed by: INTERNAL MEDICINE

## 2023-07-20 PROCEDURE — 3079F DIAST BP 80-89 MM HG: CPT | Performed by: INTERNAL MEDICINE

## 2023-07-20 RX ORDER — ALBUTEROL SULFATE 90 UG/1
2 AEROSOL, METERED RESPIRATORY (INHALATION) EVERY 4 HOURS PRN
COMMUNITY

## 2023-07-20 RX ORDER — PANTOPRAZOLE SODIUM 40 MG/1
40 TABLET, DELAYED RELEASE ORAL
Qty: 30 TABLET | Refills: 0 | Status: SHIPPED | OUTPATIENT
Start: 2023-07-20

## 2023-08-23 RX ORDER — PANTOPRAZOLE SODIUM 40 MG/1
40 TABLET, DELAYED RELEASE ORAL
Qty: 30 TABLET | Refills: 0 | Status: SHIPPED | OUTPATIENT
Start: 2023-08-23

## 2023-09-20 ENCOUNTER — HOSPITAL ENCOUNTER (EMERGENCY)
Age: 43
Discharge: HOME | End: 2023-09-21
Attending: EMERGENCY MEDICINE
Payer: MEDICAID

## 2023-09-20 VITALS
OXYGEN SATURATION: 98 % | DIASTOLIC BLOOD PRESSURE: 69 MMHG | RESPIRATION RATE: 18 BRPM | WEIGHT: 220 LBS | HEIGHT: 65 IN | SYSTOLIC BLOOD PRESSURE: 139 MMHG | HEART RATE: 88 BPM | TEMPERATURE: 98 F | BODY MASS INDEX: 36.65 KG/M2

## 2023-09-20 DIAGNOSIS — S05.01XA ABRASION OF RIGHT CORNEA, INITIAL ENCOUNTER: Primary | ICD-10-CM

## 2023-09-20 PROCEDURE — 90471 IMMUNIZATION ADMIN: CPT

## 2023-09-20 PROCEDURE — 99283 EMERGENCY DEPT VISIT LOW MDM: CPT

## 2023-09-20 PROCEDURE — 99284 EMERGENCY DEPT VISIT MOD MDM: CPT

## 2023-09-20 RX ORDER — TETRACAINE HYDROCHLORIDE 5 MG/ML
1 SOLUTION OPHTHALMIC ONCE
Status: COMPLETED | OUTPATIENT
Start: 2023-09-20 | End: 2023-09-20

## 2023-09-20 RX ORDER — IBUPROFEN 600 MG/1
600 TABLET ORAL ONCE
Status: COMPLETED | OUTPATIENT
Start: 2023-09-20 | End: 2023-09-20

## 2023-09-21 RX ORDER — CIPROFLOXACIN HYDROCHLORIDE 3.5 MG/ML
2 SOLUTION/ DROPS TOPICAL
Qty: 1 EACH | Refills: 0 | Status: SHIPPED | OUTPATIENT
Start: 2023-09-21 | End: 2023-09-28

## 2023-09-21 NOTE — DISCHARGE INSTRUCTIONS
Follow-up with the eye doctor within the next 24 to 48 hours for reassessment. Do not wear contacts until cleared by them. Use the antibiotic as prescribed. Return for worsening pain, vision change or any other concerning symptoms.

## 2023-09-21 NOTE — ED INITIAL ASSESSMENT (HPI)
Pt to ed with c/o burning, blurred vision and sensitivity to light to right eye. PT states she woke up like this.  PT with unknown FB

## 2023-09-25 RX ORDER — PANTOPRAZOLE SODIUM 40 MG/1
40 TABLET, DELAYED RELEASE ORAL
Qty: 90 TABLET | Refills: 0 | Status: SHIPPED | OUTPATIENT
Start: 2023-09-25

## 2023-11-14 ENCOUNTER — TELEPHONE (OUTPATIENT)
Dept: INTERNAL MEDICINE CLINIC | Facility: CLINIC | Age: 43
End: 2023-11-14

## 2023-11-14 NOTE — TELEPHONE ENCOUNTER
Future Appointments   Date Time Provider Department Center   11/17/2023 10:20 AM Irving Greenwood MD [facility]     Pt sched 20 min appt for anxiety

## 2023-11-17 ENCOUNTER — TELEPHONE (OUTPATIENT)
Dept: INTERNAL MEDICINE CLINIC | Facility: CLINIC | Age: 43
End: 2023-11-17

## 2023-11-17 RX ORDER — ALBUTEROL SULFATE 90 UG/1
2 AEROSOL, METERED RESPIRATORY (INHALATION) EVERY 4 HOURS PRN
Qty: 1 EACH | Refills: 3 | Status: SHIPPED | OUTPATIENT
Start: 2023-11-17

## 2023-11-17 NOTE — TELEPHONE ENCOUNTER
Pharmacy is requesting a change in prescription    Medication Quantity Refills Start End   albuterol 108 (90 Base) MCG/ACT Inhalation Aero Soln 1 each 5 11/17/2023    Sig:   Inhale 2 puffs into the lungs every 6 (six) hours as needed.     Route:   Inhalation       Insurance will only pay for Pro-Air.

## 2023-12-19 ENCOUNTER — TELEPHONE (OUTPATIENT)
Dept: INTERNAL MEDICINE CLINIC | Facility: CLINIC | Age: 43
End: 2023-12-19

## 2023-12-19 NOTE — TELEPHONE ENCOUNTER
Pt calling as she is supposed to follow-up with AD regarding medication. No appts available soon, please advise when pt can be seen. VV an option?  Also pt said she was supposed to have labwork done but no orders are in?

## 2023-12-19 NOTE — TELEPHONE ENCOUNTER
PSR - Pt needs annual physical scheduled and then annual lab orders will be placed. Can see AD or partner, needs 40 min in person OV.

## 2023-12-19 NOTE — TELEPHONE ENCOUNTER
Unfortunately, with my recent sick days and adding patients, I do not have any availability or add-on spots. Please schedule for soonest available, or partner.

## 2023-12-19 NOTE — TELEPHONE ENCOUNTER
AD please advise if pt can be seen sooner then first available for CPE. She wanted to discuss medications and thought you had already ordered lab work for her.

## 2024-01-05 ENCOUNTER — TELEPHONE (OUTPATIENT)
Dept: INTERNAL MEDICINE CLINIC | Facility: CLINIC | Age: 44
End: 2024-01-05

## 2024-01-05 NOTE — TELEPHONE ENCOUNTER
Video appt for anxiety - may need to be triaged, ok for video?    Appointment For: Iman Yun ([id number])   Visit Type: MYCHART VIDEO FOLLOW UP (7508)      1/24/2024   11:20 AM  20 mins.  Irving Greenwood               [facility] [address]      Patient Comments:   Anxiety

## 2024-01-25 ENCOUNTER — OFFICE VISIT (OUTPATIENT)
Dept: INTERNAL MEDICINE CLINIC | Facility: CLINIC | Age: 44
End: 2024-01-25
Payer: MEDICAID

## 2024-01-25 VITALS
RESPIRATION RATE: 16 BRPM | DIASTOLIC BLOOD PRESSURE: 80 MMHG | SYSTOLIC BLOOD PRESSURE: 122 MMHG | HEART RATE: 88 BPM | BODY MASS INDEX: 40.15 KG/M2 | WEIGHT: 241 LBS | HEIGHT: 65 IN

## 2024-01-25 DIAGNOSIS — J45.21 MILD INTERMITTENT ASTHMA WITH ACUTE EXACERBATION: ICD-10-CM

## 2024-01-25 DIAGNOSIS — F41.9 ANXIETY: ICD-10-CM

## 2024-01-25 DIAGNOSIS — E55.9 VITAMIN D DEFICIENCY: ICD-10-CM

## 2024-01-25 DIAGNOSIS — E66.9 CLASS 2 OBESITY: Primary | ICD-10-CM

## 2024-01-25 DIAGNOSIS — K21.9 GASTROESOPHAGEAL REFLUX DISEASE WITHOUT ESOPHAGITIS: ICD-10-CM

## 2024-01-25 DIAGNOSIS — Z51.81 THERAPEUTIC DRUG MONITORING: ICD-10-CM

## 2024-01-25 PROCEDURE — 3079F DIAST BP 80-89 MM HG: CPT | Performed by: INTERNAL MEDICINE

## 2024-01-25 PROCEDURE — 99204 OFFICE O/P NEW MOD 45 MIN: CPT | Performed by: INTERNAL MEDICINE

## 2024-01-25 PROCEDURE — 3008F BODY MASS INDEX DOCD: CPT | Performed by: INTERNAL MEDICINE

## 2024-01-25 PROCEDURE — 3074F SYST BP LT 130 MM HG: CPT | Performed by: INTERNAL MEDICINE

## 2024-01-25 NOTE — PROGRESS NOTES
HISTORY OF PRESENT ILLNESS  Chief Complaint   Patient presents with    Weight Problem     Mom referral       Iman Yun is a 43 year old female new to our office today for initiation of medical weight loss program.  Patient presents today with c/o excess weight.     Does not eat enough   Struggling since her children   Trying to eat 3x per day and snacks   Packing food and meal prepping   Has 11 lb since the beginning of year.   QUIT SMOKING  9 months ago!!!    Ultimate goal 140 lb     Title    General Information  Patient stated 6 month goal: To lose weight in a healthy way   Patient stated 1 year goal: To be eating and living healthy and have lost weight   Patient stated readiness to make a Lifestyle Change (0 = no desire; 10 = extremely motivated): 10 Patient stated willingness to take medication(s) for weight loss (0 = no interest; 10 = ready to start): 10 Patient stated level of interest in bariatric surgery (0 = not interested; 10 = very interested): 5   Patient agreement to achieve  weight loss: Show up for scheduled follow-up appointments, timely complete tests as discussed and ordered (labs, cardiac testing, sleep study and/or imaging), work toward goal of exercising 30 minutes 5 days, 150 minutes/week, make an appointment with mark Schultz to assist in making dietary changes, take medication as prescribed and contact clinic if any questions or concerns regarding medication, reach the goal of losing 5-10% of total body weight within 6 months of consultation, ask for clarification, help or support when needed and give myself credit for my accomplishments and patience during this process.: I agree  Previous Weight Loss  Referral source to [facility]: Primary Care Provider  Patient stated previous weight loss history: Exercise, weight loss challenges  Patient stated eating behaviors/patterns that have been barriers to weight loss success in the past: Not eating  enough  24 Hour Food Journal  Breakfast: Either 2 eggomelete with mushrooms sometimes broccoli, or 1/2 cup dry cherios with an apple Lunch: Califlower, or salad,or  chicken breast with a piece off fruit   Dinner: Chicken with a vegetable and a tiny scoop of starch, or steak with vegetable  tiny scoop of starch Snacks: Apple, or carrots, or broccoli,Cauliflower,  pretzels, nurs   Fluids: Water, Propel, coffee    Lifestyle   Patient stated use of tobacco: No Patient stated # of alcoholic beverages per week: 2   Patient stated supplements taken on a regular basis include: 0   Exercise   Patient stated exercises # days/week: 3 Patient stated perceived level of exertion: 1 Patient stated average level of stress: 10   Patient stated activities: Upper body, lower body, Cardio,   Patient stated coping strategies: I'm figuring that out now, i quit smoking 9 months ago now I'm trying to be healthy and for so i can be and do better   Sleep   Patient stated # hours uninterrupted sleep: 4 Patient stated # times awakened: 8 Patient stated feels restful: No   Patient stated snores: No Patient stated has sleep apnea: No Patient stated uses sleep device: None                    Wt Readings from Last 6 Encounters:   01/25/24 241 lb (109.3 kg)   09/20/23 220 lb (99.8 kg)   07/20/23 232 lb 3.2 oz (105.3 kg)   01/25/23 218 lb (98.9 kg)   07/14/22 218 lb 14.7 oz (99.3 kg)   04/20/22 215 lb (97.5 kg)          Breakfast Lunch Dinner Snacks Fluids   Reviewed          Social hx and lifestyle reviewed:    Work: 5 days per week, work eveing s  Marital status: single   Support: good   Tobacco use: negative   ETOH use: 3x per week   Supplements: negative  Exercise: Tries to stay active at work/  Stress level: high /10  Sleep hours and integrity: aweful, wakes up often and interrupted       MEDICAL HISTORY  PMH reviewed:   Cardiac disorders:negative    Depression/anxiety: YES   Glaucoma: negative   Kidney stones: negative   Eating disorder:  negative   Migraines: negative   Seizures: negative   Joint-related conditions:negative   Liver disease: negative   Renal disease: negative   Diabetes: negative  Thyroid disease: negative   Constipation: negative  DVT: negative    Family or personal history of Pancreatic issues / Medullary Thyroid Cancer: negative    History of bariatric surgery: negative     FMH reviewed: obesity in parent/s or sibling:  YES     REVIEW OF SYSTEMS  GENERAL: feels well otherwise,   NECK: denies thickening   LUNGS: denies shortness of breath with exertion, no apnea   CARDIOVASCULAR: denies chest pain on exertion , denies palpitations or pedal edema  GI: denies abdominal pain.  No N/V/D/C  MUSCULOSKELETAL: denies joint pains   NEURO: denies headaches   PSYCH: denies change in behavior or mood, denies feeling sad or depressed     EXAM  /80   Pulse 88   Resp 16   Ht 5' 5\" (1.651 m)   Wt 241 lb (109.3 kg)   LMP 07/08/2022   BMI 40.10 kg/m² ,     GENERAL: well developed, well nourished, in no apparent distress,   SKIN: warm, pink, dry without rashes to exposed area   EYES: conjunctiva pink, sclera non icteric, PERRL  HEENT: atraumatic, normocephalic, O/p: Mallampati score- 2  NECK: supple, non tender, no adenopathy, no thyromegaly,   LUNGS: CTA in all fields, breathing non labored  CARDIO: RRR without murmur, normal S1 and S2 without clicks or gallops, no pedal edema. GI: rotund, no masses, HSM or tenderness  MUSCULOSKELETAL: grossly intact   NEURO: Oriented times three, full ROM of bilateral UE/LE  PSYCH: pleasant, cooperative, normal mood and affect,     Lab Results   Component Value Date    WBC 6.7 01/26/2024    RBC 4.65 01/26/2024    HGB 13.5 01/26/2024    HCT 40.2 01/26/2024    MCV 86.5 01/26/2024    MCH 29.0 01/26/2024    MCHC 33.6 01/26/2024    RDW 12.8 01/26/2024    .0 01/26/2024    MPV 9.1 (L) 12/18/2012     Lab Results   Component Value Date    GLU 82 01/26/2024    BUN 13 01/26/2024    BUNCREA NOT APPLICABLE  03/09/2022    CREATSERUM 0.75 01/26/2024    ANIONGAP 4 01/26/2024     09/01/2017    GFRNAA 109 03/09/2022    GFRAA 126 03/09/2022    CA 9.4 01/26/2024    OSMOCALC 285 01/26/2024    ALKPHO 65 01/26/2024    AST 17 01/26/2024    ALT  01/26/2024      Comment:      Due to  backorder we are temporarily unable to offer hospital-based ALT testing at [facility].   If urgently needed, please order ALT test code [id number].   The new order will need a new venipuncture and will be sent to [facility] for testing.   The expected turnaround time will be within 24 hours.     BILT 0.4 01/26/2024    TP 7.9 01/26/2024    ALB 4.2 01/26/2024    GLOBULIN 3.7 01/26/2024    AGRATIO 1.6 03/09/2022     01/26/2024    K 4.2 01/26/2024     01/26/2024    CO2 24.0 01/26/2024     Lab Results   Component Value Date     01/26/2024    A1C 5.8 (H) 01/26/2024     Lab Results   Component Value Date    CHOLEST 191 01/26/2024    TRIG 117 01/26/2024    HDL 45 01/26/2024     (H) 01/26/2024    VLDL 21 01/26/2024    NONHDLC 146 (H) 01/26/2024     Lab Results   Component Value Date    T4F 0.9 01/26/2024    TSH 0.648 01/26/2024    TSHT4 1.22 03/09/2022     Lab Results   Component Value Date    B12 220 01/26/2024     Lab Results   Component Value Date    VITD 7.9 (L) 01/26/2024       Current Outpatient Medications on File Prior to Visit   Medication Sig Dispense Refill    escitalopram (LEXAPRO) 20 MG Oral Tab Take 1 tablet (20 mg total) by mouth daily. 90 tablet 0    clonazePAM 1 MG Oral Tab Take 1 tablet (1 mg total) by mouth daily as needed for Anxiety. 10 tablet 0    albuterol (PROAIR HFA) 108 (90 Base) MCG/ACT Inhalation Aero Soln Inhale 2 puffs into the lungs every 4 (four) hours as needed for Wheezing. 1 each 3    pantoprazole 40 MG Oral Tab EC Take 1 tablet (40 mg total) by mouth before breakfast. 90 tablet 0     No current facility-administered medications on file prior to visit.       ASSESSMENT  Initial  Weight Data and Goal Weight Loss:       Diagnoses and all orders for this visit:    Class 2 obesity  -     [facility] Medical Nutrition Therapy DIETITIAN - [place] Location  -     B12 AND FOLATE; Future  -     CBC With Differential With Platelet; Future  -     Comp Metabolic Panel (14); Future  -     Hemoglobin A1C; Future  -     Lipid Panel; Future  -     Vitamin D; Future  -     TSH and Free T4; Future  -     EKG 12 Lead performed at [facility]; Future    Gastroesophageal reflux disease without esophagitis  -     [facility] Medical Nutrition Therapy DIETITIAN [facility] Location  -     B12 AND FOLATE; Future  -     CBC With Differential With Platelet; Future  -     Comp Metabolic Panel (14); Future  -     Hemoglobin A1C; Future  -     Lipid Panel; Future  -     Vitamin D; Future  -     TSH and Free T4; Future  -     EKG 12 Lead performed at [facility]; Future    Therapeutic drug monitoring  -     [facility] Medical Nutrition Therapy DIETITIAN [facility] Location  -     B12 AND FOLATE; Future  -     CBC With Differential With Platelet; Future  -     Comp Metabolic Panel (14); Future  -     Hemoglobin A1C; Future  -     Lipid Panel; Future  -     Vitamin D; Future  -     TSH and Free T4; Future  -     EKG 12 Lead performed at [facility]; Future    Mild intermittent asthma with acute exacerbation  -     [facility] Medical Nutrition Therapy DIETITIAN [facility] Location  -     B12 AND FOLATE; Future  -     CBC With Differential With Platelet; Future  -     Comp Metabolic Panel (14); Future  -     Hemoglobin A1C; Future  -     Lipid Panel; Future  -     Vitamin D; Future  -     TSH and Free T4; Future  -     EKG 12 Lead performed at [facility]; Future    Anxiety  -     [facility] Weight [facility] Medical Nutrition Therapy DIETITIAN [facility] Location  -     B12 AND FOLATE; Future  -     CBC With Differential  With Platelet; Future  -     Comp Metabolic Panel (14); Future  -     Hemoglobin A1C; Future  -     Lipid Panel; Future  -     Vitamin D; Future  -     TSH and Free T4; Future  -     EKG 12 Lead performed at [facility]; Future    Vitamin D deficiency        PLAN  Medication use and side effects reviewed with patient.  Medication contraindications: n/a  Baseline ecg ordered, must complete   Baseline labs ordered for 2/2 causes of weight gain.   Trial of phentermine 15 mg q day   -advised of side effects and adverse effects of this medication  Reviewed weight loss and improvement in asthma   VIt D def : recheck labwork   Follow up with dietitian and psychologist as recommended.  Discussed the role of sleep and stress in weight management.  Labs orders as above.  Counseled on comprehensive weight loss plan including attention to nutrition, exercise and behavior/stress management for success. See patient instruction below for more details.  Weight Loss Consent to treat reviewed and signed.    There are no Patient Instructions on file for this visit.    Return in about 8 weeks (around 3/21/2024).    Patient verbalizes understanding.    Maricel Zarate MD

## 2024-01-26 ENCOUNTER — LAB ENCOUNTER (OUTPATIENT)
Dept: LAB | Age: 44
End: 2024-01-26
Attending: INTERNAL MEDICINE
Payer: MEDICAID

## 2024-01-26 ENCOUNTER — PATIENT MESSAGE (OUTPATIENT)
Dept: INTERNAL MEDICINE CLINIC | Facility: CLINIC | Age: 44
End: 2024-01-26

## 2024-01-26 DIAGNOSIS — E66.9 CLASS 2 OBESITY: ICD-10-CM

## 2024-01-26 DIAGNOSIS — K21.9 GASTROESOPHAGEAL REFLUX DISEASE WITHOUT ESOPHAGITIS: ICD-10-CM

## 2024-01-26 DIAGNOSIS — J45.21 MILD INTERMITTENT ASTHMA WITH ACUTE EXACERBATION: Primary | ICD-10-CM

## 2024-01-26 DIAGNOSIS — Z51.81 THERAPEUTIC DRUG MONITORING: ICD-10-CM

## 2024-01-26 DIAGNOSIS — F41.9 ANXIETY: ICD-10-CM

## 2024-01-26 DIAGNOSIS — J45.21 MILD INTERMITTENT ASTHMA WITH ACUTE EXACERBATION: ICD-10-CM

## 2024-01-26 LAB
ALBUMIN SERPL-MCNC: 4.2 G/DL (ref 3.4–5)
ALBUMIN/GLOB SERPL: 1.1 {RATIO} (ref 1–2)
ALP LIVER SERPL-CCNC: 65 U/L
ANION GAP SERPL CALC-SCNC: 4 MMOL/L (ref 0–18)
AST SERPL-CCNC: 17 U/L (ref 15–37)
BASOPHILS # BLD AUTO: 0.04 X10(3) UL (ref 0–0.2)
BASOPHILS NFR BLD AUTO: 0.6 %
BILIRUB SERPL-MCNC: 0.4 MG/DL (ref 0.1–2)
BUN BLD-MCNC: 13 MG/DL (ref 9–23)
CALCIUM BLD-MCNC: 9.4 MG/DL (ref 8.5–10.1)
CHLORIDE SERPL-SCNC: 110 MMOL/L (ref 98–112)
CHOLEST SERPL-MCNC: 191 MG/DL (ref ?–200)
CO2 SERPL-SCNC: 24 MMOL/L (ref 21–32)
CREAT BLD-MCNC: 0.75 MG/DL
EGFRCR SERPLBLD CKD-EPI 2021: 102 ML/MIN/1.73M2 (ref 60–?)
EOSINOPHIL # BLD AUTO: 0.18 X10(3) UL (ref 0–0.7)
EOSINOPHIL NFR BLD AUTO: 2.7 %
ERYTHROCYTE [DISTWIDTH] IN BLOOD BY AUTOMATED COUNT: 12.8 %
EST. AVERAGE GLUCOSE BLD GHB EST-MCNC: 120 MG/DL (ref 68–126)
FASTING PATIENT LIPID ANSWER: YES
FASTING STATUS PATIENT QL REPORTED: YES
FOLATE SERPL-MCNC: 13 NG/ML (ref 8.7–?)
GLOBULIN PLAS-MCNC: 3.7 G/DL (ref 2.8–4.4)
GLUCOSE BLD-MCNC: 82 MG/DL (ref 70–99)
HBA1C MFR BLD: 5.8 % (ref ?–5.7)
HCT VFR BLD AUTO: 40.2 %
HDLC SERPL-MCNC: 45 MG/DL (ref 40–59)
HGB BLD-MCNC: 13.5 G/DL
IMM GRANULOCYTES # BLD AUTO: 0.02 X10(3) UL (ref 0–1)
IMM GRANULOCYTES NFR BLD: 0.3 %
LDLC SERPL CALC-MCNC: 125 MG/DL (ref ?–100)
LYMPHOCYTES # BLD AUTO: 2.47 X10(3) UL (ref 1–4)
LYMPHOCYTES NFR BLD AUTO: 36.8 %
MCH RBC QN AUTO: 29 PG (ref 26–34)
MCHC RBC AUTO-ENTMCNC: 33.6 G/DL (ref 31–37)
MCV RBC AUTO: 86.5 FL
MONOCYTES # BLD AUTO: 0.48 X10(3) UL (ref 0.1–1)
MONOCYTES NFR BLD AUTO: 7.1 %
NEUTROPHILS # BLD AUTO: 3.53 X10 (3) UL (ref 1.5–7.7)
NEUTROPHILS # BLD AUTO: 3.53 X10(3) UL (ref 1.5–7.7)
NEUTROPHILS NFR BLD AUTO: 52.5 %
NONHDLC SERPL-MCNC: 146 MG/DL (ref ?–130)
OSMOLALITY SERPL CALC.SUM OF ELEC: 285 MOSM/KG (ref 275–295)
PLATELET # BLD AUTO: 385 10(3)UL (ref 150–450)
POTASSIUM SERPL-SCNC: 4.2 MMOL/L (ref 3.5–5.1)
PROT SERPL-MCNC: 7.9 G/DL (ref 6.4–8.2)
RBC # BLD AUTO: 4.65 X10(6)UL
SODIUM SERPL-SCNC: 138 MMOL/L (ref 136–145)
T4 FREE SERPL-MCNC: 0.9 NG/DL (ref 0.8–1.7)
TRIGL SERPL-MCNC: 117 MG/DL (ref 30–149)
TSI SER-ACNC: 0.65 MIU/ML (ref 0.36–3.74)
VIT B12 SERPL-MCNC: 220 PG/ML (ref 193–986)
VIT D+METAB SERPL-MCNC: 7.9 NG/ML (ref 30–100)
VLDLC SERPL CALC-MCNC: 21 MG/DL (ref 0–30)
WBC # BLD AUTO: 6.7 X10(3) UL (ref 4–11)

## 2024-01-26 PROCEDURE — 80061 LIPID PANEL: CPT

## 2024-01-26 PROCEDURE — 83036 HEMOGLOBIN GLYCOSYLATED A1C: CPT

## 2024-01-26 PROCEDURE — 84439 ASSAY OF FREE THYROXINE: CPT

## 2024-01-26 PROCEDURE — 36415 COLL VENOUS BLD VENIPUNCTURE: CPT

## 2024-01-26 PROCEDURE — 84443 ASSAY THYROID STIM HORMONE: CPT

## 2024-01-26 PROCEDURE — 80053 COMPREHEN METABOLIC PANEL: CPT

## 2024-01-26 PROCEDURE — 85025 COMPLETE CBC W/AUTO DIFF WBC: CPT

## 2024-01-26 PROCEDURE — 82746 ASSAY OF FOLIC ACID SERUM: CPT

## 2024-01-26 PROCEDURE — 82306 VITAMIN D 25 HYDROXY: CPT

## 2024-01-26 PROCEDURE — 82607 VITAMIN B-12: CPT

## 2024-01-26 NOTE — TELEPHONE ENCOUNTER
Dr Zarate, no meds ordered. You did order labs and EKG. (Also looks like EKG was done in office) Were you considering meds based on results?

## 2024-01-26 NOTE — TELEPHONE ENCOUNTER
From: Iman Yun  To: Maricel Zarate  Sent: 1/26/2024 11:06 AM CST  Subject: Medicine    Went to pharmacy to  that medication and they said they never received anything

## 2024-01-29 RX ORDER — PHENTERMINE HYDROCHLORIDE 15 MG/1
15 CAPSULE ORAL EVERY MORNING
Qty: 30 CAPSULE | Refills: 2 | Status: SHIPPED | OUTPATIENT
Start: 2024-01-29 | End: 2024-01-29

## 2024-02-10 ENCOUNTER — EKG ENCOUNTER (OUTPATIENT)
Dept: LAB | Age: 44
End: 2024-02-10
Attending: INTERNAL MEDICINE
Payer: MEDICAID

## 2024-02-10 DIAGNOSIS — Z51.81 THERAPEUTIC DRUG MONITORING: ICD-10-CM

## 2024-02-10 DIAGNOSIS — K21.9 GASTROESOPHAGEAL REFLUX DISEASE WITHOUT ESOPHAGITIS: ICD-10-CM

## 2024-02-10 DIAGNOSIS — E66.9 CLASS 2 OBESITY: ICD-10-CM

## 2024-02-10 DIAGNOSIS — J45.21 MILD INTERMITTENT ASTHMA WITH ACUTE EXACERBATION: ICD-10-CM

## 2024-02-10 DIAGNOSIS — F41.9 ANXIETY: ICD-10-CM

## 2024-02-10 LAB
ATRIAL RATE: 72 BPM
P AXIS: 67 DEGREES
P-R INTERVAL: 164 MS
Q-T INTERVAL: 438 MS
QRS DURATION: 92 MS
QTC CALCULATION (BEZET): 479 MS
R AXIS: 60 DEGREES
T AXIS: 68 DEGREES
VENTRICULAR RATE: 72 BPM

## 2024-02-10 PROCEDURE — 93010 ELECTROCARDIOGRAM REPORT: CPT | Performed by: STUDENT IN AN ORGANIZED HEALTH CARE EDUCATION/TRAINING PROGRAM

## 2024-02-10 PROCEDURE — 93005 ELECTROCARDIOGRAM TRACING: CPT

## 2024-02-15 ENCOUNTER — OFFICE VISIT (OUTPATIENT)
Dept: INTERNAL MEDICINE CLINIC | Facility: CLINIC | Age: 44
End: 2024-02-15
Payer: MEDICAID

## 2024-02-15 DIAGNOSIS — Z51.81 THERAPEUTIC DRUG MONITORING: ICD-10-CM

## 2024-02-15 DIAGNOSIS — E66.9 CLASS 2 OBESITY: Primary | ICD-10-CM

## 2024-02-15 DIAGNOSIS — F41.9 ANXIETY: ICD-10-CM

## 2024-02-15 DIAGNOSIS — J45.21 MILD INTERMITTENT ASTHMA WITH ACUTE EXACERBATION (HCC): ICD-10-CM

## 2024-02-15 DIAGNOSIS — R73.03 PREDIABETES: ICD-10-CM

## 2024-02-15 DIAGNOSIS — K21.9 GASTROESOPHAGEAL REFLUX DISEASE WITHOUT ESOPHAGITIS: ICD-10-CM

## 2024-02-15 PROCEDURE — 99211 OFF/OP EST MAY X REQ PHY/QHP: CPT | Performed by: DIETITIAN, REGISTERED

## 2024-02-21 NOTE — PROGRESS NOTES
FOLLOW UP NUTRITION CONSULTATION    This consultation was conducted via real time interactive audio and video on***    Nutrition Assessment    Diagnosis:***    Number of consultations with dietitian: ***    Height:  Ht Readings from Last 1 Encounters:   01/25/24 5' 5\" (1.651 m)       Weight:   Wt Readings from Last 2 Encounters:   01/25/24 241 lb (109.3 kg)   09/20/23 220 lb (99.8 kg)       BMI:  BMI Readings from Last 1 Encounters:   01/25/24 40.10 kg/m²       Weight change: {WEIGHT CHANGE:3539} of *** in the past ***        Current Diet/Changes in Eating Habits: ***      Diet/Lifestyle Modifications Following Previous Nutrition Consultation      Food journal: ***    Physical activity: ***    Spent *** minutes in consultation with the patient.      Nutrition Assessment and Intervention/Education:    Nutrition follow up for weight loss was provided. *** Patient agreed to goals below.    Goals:  ***    Monitoring/Evaluation: {{Monitoring/Evaluation:7954}        Aureliano Aguilar MS, RD, LDN

## 2024-02-21 NOTE — PROGRESS NOTES
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 doctor's appts prepping for lung transplant      Labs:   No components found for: \"HGBA1C\"  Triglycerides   Date Value Ref Range Status   01/26/2024 117 30 - 149 mg/dL Final     Comment:     Reference interval for fasting triglycerides  Desirable: <150 mg/dL  Borderline: 150-199 mg/dL  High: 200-499 mg/dL  Very High: >=500 mg/dL           LDL Cholesterol Calc   Date Value Ref Range Status   04/22/2013 134 (H) 0 - 99 mg/dL Final     LDL Cholesterol   Date Value Ref Range Status   01/26/2024 125 (H) <100 mg/dL Final     Comment:     Desirable <100 mg/dL   Borderline 100-129 mg/dL   High     >=130mg/dL         HDL Cholesterol   Date Value Ref Range Status   01/26/2024 45 40 - 59 mg/dL Final     Comment:     Interpretive Information:   An HDL cholesterol <40 mg/dL is low and constitutes a coronary heart disease risk factor. An HDL cholesterol >60 mg/dL is a negative risk factor for coronary heart disease.       04/22/2013 44 >39 mg/dL Final     Comment:     According to ATP-III Guidelines, HDL-C >59 mg/dL is considered a  negative risk factor for CHD.     AST   Date Value Ref Range Status   01/26/2024 17 15 - 37 U/L Final   03/09/2022 15 10 - 30 U/L Final     ALT   Date Value Ref Range Status   01/26/2024   Final     Comment:     Due to  backorder we are temporarily unable to offer hospital-based ALT testing at [place] [facility].   If urgently needed, please order ALT test code [id number].   The new order will need a new venipuncture and will be sent to [facility] for testing.   The expected turnaround time will be within 24 hours.    03/09/2022 17 6 - 29 U/L Final         Height:  Ht  Readings from Last 1 Encounters:   01/25/24 5' 5\" (1.651 m)       Weight:   Wt Readings from Last 2 Encounters:   01/25/24 241 lb (109.3 kg)   09/20/23 220 lb (99.8 kg)       BMI Readings from Last 1 Encounters:   01/25/24 40.10 kg/m²     Diet/Weight History: Max weight currently.  Goal weight of 140 lbs    Current Diet:  Reports disinterest in food.  Trying to eat 3 times daily.  Dislikes water.  Describes herself as a picky eater.  Avoids seafood, yogurt, pork.   Unfamiliar foods cause anxiety/gagging.    Food/Beverage Intake: oral recall  Breakfast: not hungry in am.  Apple  Lunch: Chicken breast with cauliflower or salad.  Packing food recently  Dinner: Steak and potatoes at work.  Trying to eat more chicken, less beef  Snacks: sweets  Beverages: Watermelon Propel.  Hard to drink plain water, coffee with reduced flavored creamer than previously    Meal pattern: 1-3 meals/d,  snacks/d vary    Number of meals/week eaten at restaurants: Eats dinner at work often 4-5 days weekly and picks up food when running mom to appointments or to kids appts        Alcohol Intake: 5 drinks/wk, 1 drink provided at work per shift    Estimated caloric needs for weight loss: 1750 cals/d for 1.5 pounds/week weight loss    Physical Activity: 0 hrs/week of structured activity.  On her feet at work    Food Journal: Lifestyle is busy.  Hard to keep up    Spent 60 minutes in consultation with the patient.      Nutrition Intervention/Education:  Comprehensive nutrition education and evaluation provided for weight loss. Pt has erratic schedule working FT, caring for niece and nephew and taking mom to MD appts.  Often skips meals, becomes overly hungry and picks up fast food.  Discussed Grab and Go snacks to pack to take in car. Does not get adequate hydration.  Buying a thermos to take with her was agreed upon.  Provided sample meal and snack options.  Eating 3 meals daily was recommended.Patient agreed to goals below.    Goals:   Avoid  meal skipping  Pack grab and go healthy snacks to take in the car  Buy an insulated water bottle to take with her        Monitoring/Evaluation:  Patient encouraged to schedule follow up appt          Aureliano Aguilar MS, RD, LDN

## 2024-02-22 ENCOUNTER — OFFICE VISIT (OUTPATIENT)
Dept: INTERNAL MEDICINE CLINIC | Facility: CLINIC | Age: 44
End: 2024-02-22
Payer: MEDICAID

## 2024-02-22 VITALS — HEIGHT: 65 IN | BODY MASS INDEX: 40 KG/M2

## 2024-02-22 DIAGNOSIS — F17.200 CURRENT EVERY DAY SMOKER: ICD-10-CM

## 2024-02-22 DIAGNOSIS — Z51.81 THERAPEUTIC DRUG MONITORING: ICD-10-CM

## 2024-02-22 DIAGNOSIS — R73.03 PREDIABETES: ICD-10-CM

## 2024-02-22 RX ORDER — PHENTERMINE HYDROCHLORIDE 15 MG/1
15 CAPSULE ORAL EVERY MORNING
Qty: 30 CAPSULE | Refills: 2 | Status: SHIPPED
Start: 2024-02-22

## 2024-02-22 NOTE — PROGRESS NOTES
HISTORY OF PRESENT ILLNESS  Chief Complaint   Patient presents with    Weight Check       Iman Yun is a 43 year old female here for follow up in medical weight loss program.     Denies chest pain, shortness of breath, dizziness, blurred vision, headache, paresthesia, nausea/vomiting.   Finally was able to get medication   The first time she took phentermine had lots of energy but did struggle with sleep   [place] that she had issues with too much increased energy.   Continues to stay active   Continues to very busy   Plans to track steps, getting 6000 steps per day   1500 calories per average   Working with Aureliano on snacking   Down 16 lb   Continues to struggle with stressors        Wt Readings from Last 6 Encounters:   01/25/24 241 lb (109.3 kg)   09/20/23 220 lb (99.8 kg)   07/20/23 232 lb 3.2 oz (105.3 kg)   01/25/23 218 lb (98.9 kg)   07/14/22 218 lb 14.7 oz (99.3 kg)   04/20/22 215 lb (97.5 kg)            Breakfast Lunch Dinner Snacks Fluids   Reviewed             REVIEW OF SYSTEMS  GENERAL HEALTH: feels well otherwise, denied any fevers chills or night sweats   RESPIRATORY: denies shortness of breath   CARDIOVASCULAR: denies chest pain  GI: denies abdominal pain    EXAM  Ht 5' 5\" (1.651 m)   LMP 07/08/2022   BMI 40.10 kg/m²   EXAM  Reviewed most recent set of vitals   Physical Exam:  alert, appears stated age and cooperative, Normocephalic, without obvious abnormality, atraumatic, lips, mucosa, and tongue normal; teeth and gums normal, Speaking in full sentences comfortably, Normal work of breathing, Skin color, texture, turgor normal. No rashes or lesions and age appropriate, normal, logical connections and person, place and time/date      Lab Results   Component Value Date    WBC 6.7 01/26/2024    RBC 4.65 01/26/2024    HGB 13.5 01/26/2024    HCT 40.2 01/26/2024    MCV 86.5 01/26/2024    MCH 29.0 01/26/2024    MCHC 33.6 01/26/2024    RDW 12.8 01/26/2024    .0 01/26/2024    MPV 9.1 (L)  12/18/2012     Lab Results   Component Value Date    GLU 82 01/26/2024    BUN 13 01/26/2024    BUNCREA NOT APPLICABLE 03/09/2022    CREATSERUM 0.75 01/26/2024    ANIONGAP 4 01/26/2024     09/01/2017    GFRNAA 109 03/09/2022    GFRAA 126 03/09/2022    CA 9.4 01/26/2024    OSMOCALC 285 01/26/2024    ALKPHO 65 01/26/2024    AST 17 01/26/2024    ALT  01/26/2024      Comment:      Due to  backorder we are temporarily unable to offer hospital-based ALT testing at [facility].   If urgently needed, please order ALT test code [id number].   The new order will need a new venipuncture and will be sent to [facility] for testing.   The expected turnaround time will be within 24 hours.     BILT 0.4 01/26/2024    TP 7.9 01/26/2024    ALB 4.2 01/26/2024    GLOBULIN 3.7 01/26/2024    AGRATIO 1.6 03/09/2022     01/26/2024    K 4.2 01/26/2024     01/26/2024    CO2 24.0 01/26/2024     Lab Results   Component Value Date     01/26/2024    A1C 5.8 (H) 01/26/2024     Lab Results   Component Value Date    CHOLEST 191 01/26/2024    TRIG 117 01/26/2024    HDL 45 01/26/2024     (H) 01/26/2024    VLDL 21 01/26/2024    NONHDLC 146 (H) 01/26/2024     Lab Results   Component Value Date    T4F 0.9 01/26/2024    TSH 0.648 01/26/2024    TSHT4 1.22 03/09/2022     Lab Results   Component Value Date    B12 220 01/26/2024     Lab Results   Component Value Date    VITD 7.9 (L) 01/26/2024       Current Outpatient Medications on File Prior to Visit   Medication Sig Dispense Refill    ergocalciferol 1.25 MG (26308 UT) Oral Cap Take 1 capsule (50,000 Units total) by mouth twice a week. With food for 12 weeks total then begin OTC Vitamin D at 2000 units daily with food thereafter 24 capsule 0    escitalopram (LEXAPRO) 20 MG Oral Tab Take 1 tablet (20 mg total) by mouth daily. 90 tablet 0    clonazePAM 1 MG Oral Tab Take 1 tablet (1 mg total) by mouth daily as needed for Anxiety. 10 tablet 0    albuterol (PROAIR HFA)  108 (90 Base) MCG/ACT Inhalation Aero Soln Inhale 2 puffs into the lungs every 4 (four) hours as needed for Wheezing. 1 each 3    pantoprazole 40 MG Oral Tab EC Take 1 tablet (40 mg total) by mouth before breakfast. 90 tablet 0     No current facility-administered medications on file prior to visit.       ASSESSMENT  Analyzed weight data:       Diagnoses and all orders for this visit:    BMI 40.0-44.9, adult (HCC)  -     Phentermine HCl 15 MG Oral Cap; Take 1 capsule (15 mg total) by mouth every morning.    Therapeutic drug monitoring  -     Phentermine HCl 15 MG Oral Cap; Take 1 capsule (15 mg total) by mouth every morning.    Prediabetes  -     Phentermine HCl 15 MG Oral Cap; Take 1 capsule (15 mg total) by mouth every morning.    Current every day smoker  -     Phentermine HCl 15 MG Oral Cap; Take 1 capsule (15 mg total) by mouth every morning.        PLAN  Wt Readings from Last 6 Encounters:   01/25/24 241 lb (109.3 kg)   09/20/23 220 lb (99.8 kg)   07/20/23 232 lb 3.2 oz (105.3 kg)   01/25/23 218 lb (98.9 kg)   07/14/22 218 lb 14.7 oz (99.3 kg)   04/20/22 215 lb (97.5 kg)     1/25/24: 241 lb   Down 16 lb thus far   Total time spent on chart review, pre-charting, obtaining history, counseling, and educating, reviewing labs was 30 minutes.  Continue phentermine 15 mg q day   -advised of side effects and adverse effects of this medication  Doing excellent with steps, work towards 10K steps   Counting calories 1500, doing well.   Nutrition: low carb diet/ recommended to eat breakfast daily/ regular protein intake  Medication use and side effects reviewed with patient.  Medication contraindications: n/a   Follow up with dietitian and psychologist as recommended.  Discussed the role of sleep and stress in weight management.  Counseled on comprehensive weight loss plan including attention to nutrition, exercise and behavior/stress management for success. See patient instruction below for more details.  Discussed  strategies to overcome barriers to successful weight loss and weight maintenance  FITTE: ACSM recommendations (150-300 minutes/ week in active weight loss)   Weight Loss consent to treat reviewed and signed n/a     There are no Patient Instructions on file for this visit.    Return in about 8 weeks (around 4/18/2024).    Patient verbalizes understanding.    Maricel Zarate MD

## 2024-04-23 RX ORDER — ESCITALOPRAM OXALATE 20 MG/1
20 TABLET ORAL DAILY
Qty: 90 TABLET | Refills: 0 | Status: SHIPPED | OUTPATIENT
Start: 2024-04-23

## 2024-04-25 RX ORDER — ERGOCALCIFEROL 1.25 MG/1
CAPSULE ORAL
Qty: 24 CAPSULE | Refills: 0 | OUTPATIENT
Start: 2024-04-25

## 2024-05-18 DIAGNOSIS — Z51.81 THERAPEUTIC DRUG MONITORING: ICD-10-CM

## 2024-05-18 DIAGNOSIS — R73.03 PREDIABETES: ICD-10-CM

## 2024-05-18 DIAGNOSIS — F17.200 CURRENT EVERY DAY SMOKER: ICD-10-CM

## 2024-05-20 RX ORDER — PHENTERMINE HYDROCHLORIDE 15 MG/1
15 CAPSULE ORAL EVERY MORNING
Qty: 30 CAPSULE | Refills: 0 | Status: SHIPPED | OUTPATIENT
Start: 2024-05-20

## 2024-05-20 NOTE — TELEPHONE ENCOUNTER
Requesting   Requested Prescriptions     Pending Prescriptions Disp Refills    PHENTERMINE HCL 15 MG Oral Cap [Pharmacy Med Name: Phentermine Hydrochloride 15 Mg Cap Kvkt] 30 capsule 0     Sig: TAKE 1 CAPSULE BY MOUTH EVERY MORNING     LOV: 2/22/24  RTC: not noted  Filled: 2/22/24 #30 with 2 refills    Future Appointments   Date Time Provider Department Center   5/21/2024 10:40 AM Maricel Zarate MD [facility]   6/25/2024 10:40 AM Maricel Zarate MD [facility]   7/25/2024 10:40 AM Maricel Zarate MD [facility]

## 2024-05-21 ENCOUNTER — OFFICE VISIT (OUTPATIENT)
Dept: INTERNAL MEDICINE CLINIC | Facility: CLINIC | Age: 44
End: 2024-05-21

## 2024-05-21 VITALS
RESPIRATION RATE: 16 BRPM | SYSTOLIC BLOOD PRESSURE: 118 MMHG | BODY MASS INDEX: 38.49 KG/M2 | HEIGHT: 65 IN | HEART RATE: 78 BPM | DIASTOLIC BLOOD PRESSURE: 82 MMHG | WEIGHT: 231 LBS

## 2024-05-21 DIAGNOSIS — K21.9 GASTROESOPHAGEAL REFLUX DISEASE WITHOUT ESOPHAGITIS: ICD-10-CM

## 2024-05-21 DIAGNOSIS — F17.200 CURRENT EVERY DAY SMOKER: ICD-10-CM

## 2024-05-21 DIAGNOSIS — Z51.81 THERAPEUTIC DRUG MONITORING: Primary | ICD-10-CM

## 2024-05-21 DIAGNOSIS — R73.03 PREDIABETES: ICD-10-CM

## 2024-05-21 DIAGNOSIS — E55.9 VITAMIN D DEFICIENCY: ICD-10-CM

## 2024-05-21 PROCEDURE — 99214 OFFICE O/P EST MOD 30 MIN: CPT | Performed by: INTERNAL MEDICINE

## 2024-05-21 RX ORDER — PHENTERMINE HYDROCHLORIDE 15 MG/1
15 CAPSULE ORAL
Qty: 60 CAPSULE | Refills: 2 | Status: SHIPPED | OUTPATIENT
Start: 2024-05-21

## 2024-05-21 NOTE — PROGRESS NOTES
HISTORY OF PRESENT ILLNESS  Chief Complaint   Patient presents with    Weight Check     Down 10        Iman Yun is a 44 year old female here for follow up in medical weight loss program.     Denies chest pain, shortness of breath, dizziness, blurred vision, headache, paresthesia, nausea/vomiting.   Does feel that she lost the first the first month then starting getting stuck   Hunger is very up and down   Trying to eat healthy when she can   Son is inpatient psych inpatient   Does eat a lot of chicken   Does struggle with picky eating   Cutting out carbs     Down 10 lb     Started back at the [facility]       Wt Readings from Last 6 Encounters:   05/21/24 231 lb (104.8 kg)   01/25/24 241 lb (109.3 kg)   09/20/23 220 lb (99.8 kg)   07/20/23 232 lb 3.2 oz (105.3 kg)   01/25/23 218 lb (98.9 kg)   07/14/22 218 lb 14.7 oz (99.3 kg)            Breakfast Lunch Dinner Snacks Fluids   Reviewed             REVIEW OF SYSTEMS  GENERAL HEALTH: feels well otherwise, denied any fevers chills or night sweats   RESPIRATORY: denies shortness of breath   CARDIOVASCULAR: denies chest pain  GI: denies abdominal pain    EXAM  /82   Pulse 78   Resp 16   Ht 5' 5\" (1.651 m)   Wt 231 lb (104.8 kg)   LMP 07/08/2022   BMI 38.44 kg/m²   GENERAL: well developed, well nourished,in no apparent distress, A/O x3  SKIN: no rashes,no suspicious lesions  HEENT: atraumatic, normocephalic, OP-clear, PERRL  NECK: supple,no adenopathy  LUNGS: clear to auscultation bilaterally   CARDIO: RRR without murmur  GI: good BS's,NT/ND, no masses or HSM  EXTREMITIES: no cyanosis, no clubbing, no edema    Lab Results   Component Value Date    WBC 6.7 01/26/2024    RBC 4.65 01/26/2024    HGB 13.5 01/26/2024    HCT 40.2 01/26/2024    MCV 86.5 01/26/2024    MCH 29.0 01/26/2024    MCHC 33.6 01/26/2024    RDW 12.8 01/26/2024    .0 01/26/2024    MPV 9.1 (L) 12/18/2012     Lab Results   Component Value Date    GLU 82 01/26/2024    BUN 13  01/26/2024    BUNCREA NOT APPLICABLE 03/09/2022    CREATSERUM 0.75 01/26/2024    ANIONGAP 4 01/26/2024     09/01/2017    GFRNAA 109 03/09/2022    GFRAA 126 03/09/2022    CA 9.4 01/26/2024    OSMOCALC 285 01/26/2024    ALKPHO 65 01/26/2024    AST 17 01/26/2024    ALT  01/26/2024      Comment:      Due to  backorder we are temporarily unable to offer hospital-based ALT testing at [facility].   If urgently needed, please order ALT test code [id number].   The new order will need a new venipuncture and will be sent to [facility] for testing.   The expected turnaround time will be within 24 hours.     BILT 0.4 01/26/2024    TP 7.9 01/26/2024    ALB 4.2 01/26/2024    GLOBULIN 3.7 01/26/2024    AGRATIO 1.6 03/09/2022     01/26/2024    K 4.2 01/26/2024     01/26/2024    CO2 24.0 01/26/2024     Lab Results   Component Value Date     01/26/2024    A1C 5.8 (H) 01/26/2024     Lab Results   Component Value Date    CHOLEST 191 01/26/2024    TRIG 117 01/26/2024    HDL 45 01/26/2024     (H) 01/26/2024    VLDL 21 01/26/2024    NONHDLC 146 (H) 01/26/2024     Lab Results   Component Value Date    T4F 0.9 01/26/2024    TSH 0.648 01/26/2024    TSHT4 1.22 03/09/2022     Lab Results   Component Value Date    B12 220 01/26/2024     Lab Results   Component Value Date    VITD 7.9 (L) 01/26/2024       Current Outpatient Medications on File Prior to Visit   Medication Sig Dispense Refill    escitalopram 20 MG Oral Tab Take 1 tablet (20 mg total) by mouth daily. 90 tablet 0    clonazePAM 1 MG Oral Tab Take 1 tablet (1 mg total) by mouth daily as needed for Anxiety. 10 tablet 0    albuterol (PROAIR HFA) 108 (90 Base) MCG/ACT Inhalation Aero Soln Inhale 2 puffs into the lungs every 4 (four) hours as needed for Wheezing. 1 each 3    pantoprazole 40 MG Oral Tab EC Take 1 tablet (40 mg total) by mouth before breakfast. 90 tablet 0     No current facility-administered medications on file prior to visit.        ASSESSMENT  Analyzed weight data:       Diagnoses and all orders for this visit:    Therapeutic drug monitoring  -     Phentermine HCl 15 MG Oral Cap; Take 1 capsule (15 mg total) by mouth 2 (two) times daily before meals.    Prediabetes  -     Phentermine HCl 15 MG Oral Cap; Take 1 capsule (15 mg total) by mouth 2 (two) times daily before meals.    BMI 40.0-44.9, adult (HCC)  -     Phentermine HCl 15 MG Oral Cap; Take 1 capsule (15 mg total) by mouth 2 (two) times daily before meals.    Vitamin D deficiency    Gastroesophageal reflux disease without esophagitis    Current every day smoker  -     Phentermine HCl 15 MG Oral Cap; Take 1 capsule (15 mg total) by mouth 2 (two) times daily before meals.        PLAN  Wt Readings from Last 6 Encounters:   05/21/24 231 lb (104.8 kg)   01/25/24 241 lb (109.3 kg)   09/20/23 220 lb (99.8 kg)   07/20/23 232 lb 3.2 oz (105.3 kg)   01/25/23 218 lb (98.9 kg)   07/14/22 218 lb 14.7 oz (99.3 kg)     1/25/24: 241 lb   Down  10 lb   Total time spent on chart review, pre-charting, obtaining history, counseling, and educating, reviewing labs was 30 minutes.  Increase phentermine 15 mg to twice per day   Reviewed protein   Doing excellent with steps, work towards 10K steps   Counting calories 1500, doing well.   Nutrition: low carb diet/ recommended to eat breakfast daily/ regular protein intake  Medication use and side effects reviewed with patient.  Medication contraindications: n/a   Follow up with dietitian and psychologist as recommended.  Discussed the role of sleep and stress in weight management.  Counseled on comprehensive weight loss plan including attention to nutrition, exercise and behavior/stress management for success. See patient instruction below for more details.  Discussed strategies to overcome barriers to successful weight loss and weight maintenance  FITTE: ACSM recommendations (150-300 minutes/ week in active weight loss)   Weight Loss consent to treat  reviewed and signed n/a     There are no Patient Instructions on file for this visit.    No follow-ups on file.    Patient verbalizes understanding.    Maricel Zarate MD

## 2024-05-22 RX ORDER — PANTOPRAZOLE SODIUM 40 MG/1
40 TABLET, DELAYED RELEASE ORAL
Qty: 90 TABLET | Refills: 3 | Status: SHIPPED | OUTPATIENT
Start: 2024-05-22

## 2024-05-22 NOTE — TELEPHONE ENCOUNTER
Refill Per Protocol     Requested Prescriptions   Pending Prescriptions Disp Refills    PANTOPRAZOLE 40 MG Oral Tab EC [Pharmacy Med Name: Pantoprazole Sodium Ec 40 Mg Tab Auro] 90 tablet 0     Sig: TAKE 1 TABLET BY MOUTH EVERY MORNING BEFORE BREAKFAST       Gastrointestional Medication Protocol Passed - 5/20/2024  1:32 AM        Passed - In person appointment or virtual visit in the past 12 mos or appointment in next 3 mos     Recent Outpatient Visits              Yesterday Therapeutic drug monitoring    [facility], [address] [place] [address] Maricel Zarate MD    Office Visit    3 months ago BMI 40.0-44.9, adult (HCC)    [address] Maricel Zarate MD    Office Visit    3 months ago Class 2 obesity    [facility], [address] [place] [address] Aureliano Aguilar RD    Office Visit    3 months ago Class 2 obesity    [facility], [address] [place] [address] Maricel Zarate MD    Office Visit    3 months ago Anxiety    [facility], [address] [place] [address] Irving Greenwood MD    Telemedicine          Future Appointments         Provider Department Appt Notes    In 2 months Maricel Zarate MD [facility], [address] [place] [address], [place] [facility]    In 4 months Maricel Zarate MD [facility], [address] [place] [address], [place]    In 6 months Maricel Zarate MD [facility], [address] [place] [address] Weight    In 7 months Maricel Zarate MD [facility], [address] [place] [address], [place]    In 8 months Maricel Zarate MD [facility], [address] [place] [address], [place]                           Future Appointments         Provider Department Appt Notes    In 2 months Maricel Zarate MD [facility], [address] [place] [address], [place] [facility]    In 4 months Maricel Zarate MD [facility], [address] [place] [address], [place]    In 6 months Maricel Zarate MD [facility],  [address] [place] [address] Weight    In 7 months Maricel Zarate MD [facility], [address] [place] [address] Weight    In 8 months Maricel Zarate MD [facility], [address] [place] [address] Weight          Recent Outpatient Visits              Yesterday Therapeutic drug monitoring    [facility], [address] [place] [address] Maricel Zarate MD    Office Visit    3 months ago BMI 40.0-44.9, adult (HCC)    [facility], [address] [place] [address] Maricel Zarate MD    Office Visit    3 months ago Class 2 obesity    [facility], [address] [place] [address] Aureliano Aguilar RD    Office Visit    3 months ago Class 2 obesity    [address] Maricel Zarate MD    Office Visit    3 months ago Anxiety    [address] Irving Greenwood MD    Telemedicine

## 2024-07-19 RX ORDER — ESCITALOPRAM OXALATE 20 MG/1
20 TABLET ORAL DAILY
Qty: 90 TABLET | Refills: 3 | Status: SHIPPED | OUTPATIENT
Start: 2024-07-19

## 2024-07-19 NOTE — TELEPHONE ENCOUNTER
REFILL PASSED PER [facility] PROTOCOLS    Requested Prescriptions   Pending Prescriptions Disp Refills    ESCITALOPRAM 20 MG Oral Tab [Pharmacy Med Name: Escitalopram Oxalate 20 Mg Tab Solc] 90 tablet 0     Sig: Take 1 tablet (20 mg total) by mouth daily.       Psychiatric Non-Scheduled (Anti-Anxiety) Passed - 7/16/2024 10:30 AM        Passed - In person appointment or virtual visit in the past 6 mos or appointment in next 3 mos     Recent Outpatient Visits              1 month ago Therapeutic drug monitoring    [facility], [address] [place] [address] Maricel Zarate MD    Office Visit    4 months ago BMI 40.0-44.9, adult (HCC)    [address] Maricel Zarate MD    Office Visit    5 months ago Class 2 obesity    [address], [place] Aureliano Aguilar RD    Office Visit    5 months ago Class 2 obesity    [address] Maricel Zarate MD    Office Visit    5 months ago Anxiety    [address] Irving Greenwood MD    Telemedicine          Future Appointments         Provider Department Appt Notes    In 6 days Maricel Zarate MD [facility], [address] [place] [address] Weight    In 2 months Maricel Zarate MD [address] Weight    In 4 months Maricel Zarate MD [address] Weight    In 5 months Maricel Zarate MD [address] Weight    In 6 months Maricel Zarate MD [address] Weight                    Passed - Depression Screening completed within the past 12 months             Future Appointments         Provider Department Appt Notes    In 6 days Maricel Zarate MD [address] Weight    In 2 months Maricel Zarate MD [address],  [place] [facility]    In 4 months Maricel Zarate MD [facility], [address] [place] [address], [place] [facility]    In 5 months Maricel Zarate MD [facility], [address] [place] [address] Weight    In 6 months Maricel Zarate MD [facility], [address] [place] [address] Weight          Recent Outpatient Visits              1 month ago Therapeutic drug monitoring    [facility], [address] [place] [address] Maricel Zarate MD    Office Visit    4 months ago BMI 40.0-44.9, adult (HCC)    [facility], [address] [place] [address] Maricel Zarate MD    Office Visit    5 months ago Class 2 obesity    [facility], [address] [place] [address] Aureliano Aguilar RD    Office Visit    5 months ago Class 2 obesity    [facility], [address] [place], [place] [address]Maricel Lamar MD    Office Visit    5 months ago Anxiety    [facility], [address] [place] [address] Irving Greenwood MD    Telemedicine

## 2024-08-06 ENCOUNTER — TELEMEDICINE (OUTPATIENT)
Dept: INTERNAL MEDICINE CLINIC | Facility: CLINIC | Age: 44
End: 2024-08-06
Payer: MEDICAID

## 2024-08-06 DIAGNOSIS — Z51.81 THERAPEUTIC DRUG MONITORING: ICD-10-CM

## 2024-08-06 DIAGNOSIS — R73.03 PREDIABETES: ICD-10-CM

## 2024-08-06 DIAGNOSIS — F17.200 CURRENT EVERY DAY SMOKER: ICD-10-CM

## 2024-08-06 PROCEDURE — 99213 OFFICE O/P EST LOW 20 MIN: CPT | Performed by: INTERNAL MEDICINE

## 2024-08-06 RX ORDER — PHENTERMINE HYDROCHLORIDE 37.5 MG/1
37.5 TABLET ORAL
Qty: 30 TABLET | Refills: 2 | Status: SHIPPED | OUTPATIENT
Start: 2024-08-06

## 2024-08-06 NOTE — PROGRESS NOTES
HISTORY OF PRESENT ILLNESS  Chief Complaint   Patient presents with    Weight Check     Video         Iman Yun is a 44 year old female here for follow up in medical weight loss program.     Denies chest pain, shortness of breath, dizziness, blurred vision, headache, paresthesia, nausea/vomiting.     Feels exercise has been limited   Feels she is having more sweet cravings through and strugglng with stress  Weight is 230 lb   Reviewed 24 dietary recall   Exercise has been more limited       Wt Readings from Last 6 Encounters:   05/21/24 231 lb (104.8 kg)   01/25/24 241 lb (109.3 kg)   09/20/23 220 lb (99.8 kg)   07/20/23 232 lb 3.2 oz (105.3 kg)   01/25/23 218 lb (98.9 kg)   07/14/22 218 lb 14.7 oz (99.3 kg)            Breakfast Lunch Dinner Snacks Fluids   Reviewed             REVIEW OF SYSTEMS  GENERAL HEALTH: feels well otherwise, denied any fevers chills or night sweats   RESPIRATORY: denies shortness of breath   CARDIOVASCULAR: denies chest pain  GI: denies abdominal pain    EXAM  LMP 07/08/2022   EXAM  Reviewed most recent set of vitals   Physical Exam:  alert, appears stated age and cooperative, Normocephalic, without obvious abnormality, atraumatic, lips, mucosa, and tongue normal; teeth and gums normal, Speaking in full sentences comfortably, Normal work of breathing, Skin color, texture, turgor normal. No rashes or lesions and age appropriate, normal, logical connections and person, place and time/date      Lab Results   Component Value Date    WBC 6.7 01/26/2024    RBC 4.65 01/26/2024    HGB 13.5 01/26/2024    HCT 40.2 01/26/2024    MCV 86.5 01/26/2024    MCH 29.0 01/26/2024    MCHC 33.6 01/26/2024    RDW 12.8 01/26/2024    .0 01/26/2024    MPV 9.1 (L) 12/18/2012     Lab Results   Component Value Date    GLU 82 01/26/2024    BUN 13 01/26/2024    BUNCREA NOT APPLICABLE 03/09/2022    CREATSERUM 0.75 01/26/2024    ANIONGAP 4 01/26/2024     09/01/2017    GFRNAA 109 03/09/2022     GFRAA 126 03/09/2022    CA 9.4 01/26/2024    OSMOCALC 285 01/26/2024    ALKPHO 65 01/26/2024    AST 17 01/26/2024    ALT  01/26/2024      Comment:      Due to  backorder we are temporarily unable to offer hospital-based ALT testing at [facility].   If urgently needed, please order ALT test code [id number].   The new order will need a new venipuncture and will be sent to [facility] for testing.   The expected turnaround time will be within 24 hours.     BILT 0.4 01/26/2024    TP 7.9 01/26/2024    ALB 4.2 01/26/2024    GLOBULIN 3.7 01/26/2024    AGRATIO 1.6 03/09/2022     01/26/2024    K 4.2 01/26/2024     01/26/2024    CO2 24.0 01/26/2024     Lab Results   Component Value Date     01/26/2024    A1C 5.8 (H) 01/26/2024     Lab Results   Component Value Date    CHOLEST 191 01/26/2024    TRIG 117 01/26/2024    HDL 45 01/26/2024     (H) 01/26/2024    VLDL 21 01/26/2024    NONHDLC 146 (H) 01/26/2024     Lab Results   Component Value Date    T4F 0.9 01/26/2024    TSH 0.648 01/26/2024    TSHT4 1.22 03/09/2022     Lab Results   Component Value Date    B12 220 01/26/2024     Lab Results   Component Value Date    VITD 7.9 (L) 01/26/2024       Current Outpatient Medications on File Prior to Visit   Medication Sig Dispense Refill    escitalopram 20 MG Oral Tab Take 1 tablet (20 mg total) by mouth daily. 90 tablet 3    pantoprazole 40 MG Oral Tab EC Take 1 tablet (40 mg total) by mouth before breakfast. 90 tablet 3    clonazePAM 1 MG Oral Tab Take 1 tablet (1 mg total) by mouth daily as needed for Anxiety. 10 tablet 0    albuterol (PROAIR HFA) 108 (90 Base) MCG/ACT Inhalation Aero Soln Inhale 2 puffs into the lungs every 4 (four) hours as needed for Wheezing. 1 each 3     No current facility-administered medications on file prior to visit.       ASSESSMENT  Analyzed weight data:       Diagnoses and all orders for this visit:    Therapeutic drug monitoring  -     Phentermine HCl 37.5 MG Oral  Tab; Take 1 tablet (37.5 mg total) by mouth every morning before breakfast.    Prediabetes  -     Phentermine HCl 37.5 MG Oral Tab; Take 1 tablet (37.5 mg total) by mouth every morning before breakfast.    BMI 40.0-44.9, adult (HCC)  -     Phentermine HCl 37.5 MG Oral Tab; Take 1 tablet (37.5 mg total) by mouth every morning before breakfast.    Current every day smoker  -     Phentermine HCl 37.5 MG Oral Tab; Take 1 tablet (37.5 mg total) by mouth every morning before breakfast.          PLAN  Wt Readings from Last 6 Encounters:   05/21/24 231 lb (104.8 kg)   01/25/24 241 lb (109.3 kg)   09/20/23 220 lb (99.8 kg)   07/20/23 232 lb 3.2 oz (105.3 kg)   01/25/23 218 lb (98.9 kg)   07/14/22 218 lb 14.7 oz (99.3 kg)     1/25/24: 241 lb   Down  10 lb   Increase phentermine 37.5 mg q day   -advised of side effects and adverse effects of this medication  Continues to struggle with weight plateau  Reviewed protein   Doing excellent with steps, work towards 10K steps   Counting calories 1500, doing well.   Nutrition: low carb diet/ recommended to eat breakfast daily/ regular protein intake  Medication use and side effects reviewed with patient.  Medication contraindications: n/a   Follow up with dietitian and psychologist as recommended.  Discussed the role of sleep and stress in weight management.  Counseled on comprehensive weight loss plan including attention to nutrition, exercise and behavior/stress management for success. See patient instruction below for more details.  Discussed strategies to overcome barriers to successful weight loss and weight maintenance  FITTE: ACSM recommendations (150-300 minutes/ week in active weight loss)   Weight Loss consent to treat reviewed and signed n/a     Patient Instructions   WEgovy       Return in about 8 weeks (around 10/1/2024).    Patient verbalizes understanding.    Maricel Zarate MD

## 2024-10-16 ENCOUNTER — TELEPHONE (OUTPATIENT)
Dept: INTERNAL MEDICINE CLINIC | Facility: CLINIC | Age: 44
End: 2024-10-16

## 2024-10-16 DIAGNOSIS — R73.03 PREDIABETES: Primary | ICD-10-CM

## 2024-10-16 RX ORDER — PHENTERMINE HYDROCHLORIDE 37.5 MG/1
37.5 TABLET ORAL
Qty: 30 TABLET | Refills: 1 | Status: SHIPPED | OUTPATIENT
Start: 2024-10-16

## 2025-01-27 ENCOUNTER — NURSE TRIAGE (OUTPATIENT)
Dept: INTERNAL MEDICINE CLINIC | Facility: CLINIC | Age: 45
End: 2025-01-27

## 2025-01-27 NOTE — TELEPHONE ENCOUNTER
Action Requested: Summary for Provider     []  Critical Lab, Recommendations Needed  [x] Need Additional Advice  []   FYI    []   Need Orders  [] Need Medications Sent to Pharmacy  []  Other     SUMMARY: patient refused sooner appointments with mid level providers, prefers to only see Dr. Greenwood. Dr. Greenwood would you be able to accommodate a sooner appointment?    Reason for call: Sick Call and Anxiety  Onset: ongoing     RN to patient call, patient states her anxiety is an ongoing issue, she has been seen by Dr. Greenwood for this previously. She states she noticed her symptoms are \"worse\" she has lost her job in December, she has new stress of looking for a new job.   Offered sooner appointments with mid level providers and patient refused. States she would like to only see Dr. Greenwood regarding this.   Patient on wait list for sooner appointment.   ER precautions advised        Reason for Disposition   Symptoms of anxiety or panic attack and is a chronic symptom (recurrent or ongoing AND present > 4 weeks)    Protocols used: Anxiety and Panic Attack-A-[place]    Future Appointments   Date Time Provider Department Center   2/25/2025 11:00 AM Irving Greenwood MD [facility]   4/10/2025  1:40 PM Maricel Zarate MD [facility]   7/2/2025  1:20 PM Maricel Zarate MD [facility]   9/3/2025  1:40 PM Maricel Zarate MD [facility]   12/10/2025  1:40 PM Maricel Zarate MD [facility]

## 2025-01-27 NOTE — TELEPHONE ENCOUNTER
Future Appointments   Date Time Provider Department Center   2/25/2025 11:00 AM Irving Greenwood MD [facility]     Patient schedule appointment for \"anxiety\"

## 2025-01-30 NOTE — TELEPHONE ENCOUNTER
Irving Greenwood MD  [facility] Clinical Staff1 minute ago (8:10 AM)     Please see if she can do a virtual or in-office evaluation today for 40 minutes during either of my openings (during 80 minute spots I've extended).     Attempted to call patient, left message to call office.

## 2025-03-03 RX ORDER — BUSPIRONE HYDROCHLORIDE 10 MG/1
10 TABLET ORAL 3 TIMES DAILY
Qty: 270 TABLET | Refills: 1 | Status: SHIPPED | OUTPATIENT
Start: 2025-03-03

## 2025-03-03 NOTE — TELEPHONE ENCOUNTER
Please kindly review this medication    [] FAILS PROTOCOL    [x] HAS NO PROTOCOL ATTACHED    Buspirone 10 mg three times daily initiated at last telemedicine appointment. Okay to provide refills or should patient schedule a medication follow up appointment?

## 2025-03-09 DIAGNOSIS — R73.03 PREDIABETES: ICD-10-CM

## 2025-03-10 RX ORDER — PHENTERMINE HYDROCHLORIDE 37.5 MG/1
37.5 TABLET ORAL
Qty: 30 TABLET | Refills: 0 | OUTPATIENT
Start: 2025-03-10

## 2025-03-26 NOTE — PROGRESS NOTES
HISTORY OF PRESENT ILLNESS  Chief Complaint   Patient presents with    Weight Check     Up 19lbs         Iman Yun is a 44 year old female here for follow up in medical weight loss program.   [facility] Follow Up    General Information  Success Moment: I don't really have one  Challenging Moment: I've gained weight  Nutrition Recall  Breakfast: Mushroom omelete Lunch: Haven't really lately   Dinner: Chicken or steak with a side and veggie Snacks: Nuts, cheese stick     Fluids: Coffee Propel Dining Out: 1   Exercise   Patient stated exercises # days/week: 1  Patient stated perceived level of   exertion: 3 Anaerobic Days: 1   Aerobic Days: 1   Patient stated average level of stress: 10  Sleep   Patient stated # hours uninterrupted sleep: 2   Patient stated feels   restful: No      Cause of disruption of sleep: I can't sleep   Goals: To find a better plan              History of Present Illness  Iman Yun is a 44 year old female who presents with weight management concerns.    She has experienced significant weight gain after initially losing weight on phentermine 37.5 mg, which she has been taking at the maximum dose. The medication was effective for a few weeks, but her body stopped responding, leading to regaining the lost weight. She feels the medication is no longer effective.    She has been experiencing increased anxiety attacks and notes that her anxiety has worsened. She mentions being prescribed an additional medication to help manage her symptoms, although the specific medication is not named.    She reports poor sleep quality, describing her sleep as 'going nuts'. She acknowledges snoring, confirmed by a family member and observed apneas. A previous sleep study was normal, but she notes deterioration in sleep quality since then and 41 lb weight gain. She experiences frequent charley horses at night, which are painful and last for two days, and wakes up frequently to urinate at  night.    She has a history of asthma, which is currently controlled. No history of high blood pressure or diabetes.    Wt Readings from Last 6 Encounters:   03/27/25 250 lb (113.4 kg)   05/21/24 231 lb (104.8 kg)   01/25/24 241 lb (109.3 kg)   09/20/23 220 lb (99.8 kg)   07/20/23 232 lb 3.2 oz (105.3 kg)   01/25/23 218 lb (98.9 kg)              Breakfast Lunch Dinner Snacks Fluids   Reviewed           REVIEW OF SYSTEMS  GENERAL HEALTH: feels well otherwise, denied any fevers chills or night sweats   RESPIRATORY: denies shortness of breath   CARDIOVASCULAR: denies chest pain  GI: denies abdominal pain    EXAM  /70   Pulse 98   Resp 20   Ht 5' 5\" (1.651 m)   Wt 250 lb (113.4 kg)   LMP 07/08/2022   SpO2 100%   BMI 41.60 kg/m²   GENERAL: well developed, well nourished,in no apparent distress, A/O x3  SKIN: no rashes,no suspicious lesions  HEENT: atraumatic, normocephalic, OP-clear, PERRL  NECK: supple,no adenopathy  LUNGS: clear to auscultation bilaterally   CARDIO: RRR without murmur  GI: good BS's,NT/ND, no masses or HSM  EXTREMITIES: no cyanosis, no clubbing, no edema    Lab Results   Component Value Date    WBC 6.7 01/26/2024    RBC 4.65 01/26/2024    HGB 13.5 01/26/2024    HCT 40.2 01/26/2024    MCV 86.5 01/26/2024    MCH 29.0 01/26/2024    MCHC 33.6 01/26/2024    RDW 12.8 01/26/2024    .0 01/26/2024    MPV 9.1 (L) 12/18/2012     Lab Results   Component Value Date    GLU 82 01/26/2024    BUN 13 01/26/2024    BUNCREA NOT APPLICABLE 03/09/2022    CREATSERUM 0.75 01/26/2024    ANIONGAP 4 01/26/2024     09/01/2017    GFRNAA 109 03/09/2022    GFRAA 126 03/09/2022    CA 9.4 01/26/2024    OSMOCALC 285 01/26/2024    ALKPHO 65 01/26/2024    AST 17 01/26/2024    ALT  01/26/2024      Comment:      Due to  backorder we are temporarily unable to offer hospital-based ALT testing at [facility].   If urgently needed, please order ALT test code [id number].   The new order will need a new  venipuncture and will be sent to [facility] for testing.   The expected turnaround time will be within 24 hours.     BILT 0.4 01/26/2024    TP 7.9 01/26/2024    ALB 4.2 01/26/2024    GLOBULIN 3.7 01/26/2024    AGRATIO 1.6 03/09/2022     01/26/2024    K 4.2 01/26/2024     01/26/2024    CO2 24.0 01/26/2024     Lab Results   Component Value Date     01/26/2024    A1C 5.8 (H) 01/26/2024     Lab Results   Component Value Date    CHOLEST 191 01/26/2024    TRIG 117 01/26/2024    HDL 45 01/26/2024     (H) 01/26/2024    VLDL 21 01/26/2024    NONHDLC 146 (H) 01/26/2024     Lab Results   Component Value Date    T4F 0.9 01/26/2024    TSH 0.648 01/26/2024    TSHT4 1.22 03/09/2022     Lab Results   Component Value Date    B12 220 01/26/2024     Lab Results   Component Value Date    VITD 7.9 (L) 01/26/2024       Medications Ordered Prior to Encounter[1]    ASSESSMENT  Analyzed weight data:  Weight Calculations  Today's Weight: 250 lbs  Title    MBSAQIP Information  Patient type: Non-Surgical   Initial Body Fat %: 44.9           Date of Initial Weight: 1/25/24       Initial Weight: 241               Have any comorbidities improved since the last visit?:        Hypertension DM 2 CAD Dyslipidemia Osteoarthritis Sleep Apnea              Diagnoses and all orders for this visit:    Therapeutic drug monitoring  -     Hemoglobin A1C; Future  -     Vitamin D; Future  -     OP REFERRAL TO DIAGNOSTIC SLEEP STUDY  -     Diethylpropion HCl ER 75 MG Oral Tablet 24 Hr; Take 1 tablet (75 mg total) by mouth every morning.  -     B12 AND FOLATE; Future  -     CBC With Differential With Platelet; Future  -     TSH and Free T4; Future  -     Lipid Panel; Future    BMI 40.0-44.9, adult (HCC)  -     Hemoglobin A1C; Future  -     Vitamin D; Future  -     OP REFERRAL TO DIAGNOSTIC SLEEP STUDY  -     Diethylpropion HCl ER 75 MG Oral Tablet 24 Hr; Take 1 tablet (75 mg total) by mouth every morning.  -     B12 AND FOLATE;  Future  -     CBC With Differential With Platelet; Future  -     TSH and Free T4; Future  -     Lipid Panel; Future    Prediabetes  -     Hemoglobin A1C; Future  -     Vitamin D; Future  -     OP REFERRAL TO DIAGNOSTIC SLEEP STUDY  -     Diethylpropion HCl ER 75 MG Oral Tablet 24 Hr; Take 1 tablet (75 mg total) by mouth every morning.  -     B12 AND FOLATE; Future  -     CBC With Differential With Platelet; Future  -     TSH and Free T4; Future  -     Lipid Panel; Future    Vitamin D deficiency  -     Hemoglobin A1C; Future  -     Vitamin D; Future  -     OP REFERRAL TO DIAGNOSTIC SLEEP STUDY  -     Diethylpropion HCl ER 75 MG Oral Tablet 24 Hr; Take 1 tablet (75 mg total) by mouth every morning.  -     B12 AND FOLATE; Future  -     CBC With Differential With Platelet; Future  -     TSH and Free T4; Future  -     Lipid Panel; Future    Gastroesophageal reflux disease without esophagitis  -     Hemoglobin A1C; Future  -     Vitamin D; Future  -     OP REFERRAL TO DIAGNOSTIC SLEEP STUDY  -     Diethylpropion HCl ER 75 MG Oral Tablet 24 Hr; Take 1 tablet (75 mg total) by mouth every morning.  -     B12 AND FOLATE; Future  -     CBC With Differential With Platelet; Future  -     TSH and Free T4; Future  -     Lipid Panel; Future    Loud snoring  -     Hemoglobin A1C; Future  -     Vitamin D; Future  -     OP REFERRAL TO DIAGNOSTIC SLEEP STUDY  -     Diethylpropion HCl ER 75 MG Oral Tablet 24 Hr; Take 1 tablet (75 mg total) by mouth every morning.  -     B12 AND FOLATE; Future  -     CBC With Differential With Platelet; Future  -     TSH and Free T4; Future  -     Lipid Panel; Future    Witnessed episode of apnea  -     Hemoglobin A1C; Future  -     Vitamin D; Future  -     OP REFERRAL TO DIAGNOSTIC SLEEP STUDY  -     Diethylpropion HCl ER 75 MG Oral Tablet 24 Hr; Take 1 tablet (75 mg total) by mouth every morning.  -     B12 AND FOLATE; Future  -     CBC With Differential With Platelet; Future  -     TSH and Free T4;  Future  -     Lipid Panel; Future        PLAN  Assessment & Plan  Obesity  Significant weight regain, primarily abdominal. Phentermine at maximum dose with reduced efficacy. Discussed alternative weight loss options, including injectables, with insurance coverage concerns. Considered diethylpropion as an alternative stimulant.  - Order sleep study for sleep apnea assessment and potential insurance coverage for injectables.  - Order A1c test for insulin resistance or diabetes evaluation.  - Prescribe diethylpropion for weight loss.  - Discuss injectable medications if insurance coverage obtained.    Insulin Resistance  Concern for insulin resistance contributing to weight gain. Not diabetic. Discussed using diabetes diagnosis for insurance coverage of weight loss medications.  - Order A1c test.  - Consider two-hour glucose tolerance test if A1c is borderline.    Sleep Apnea  Poor sleep quality, loud snoring, frequent awakenings. Previous normal sleep study, but weight gain may have led to sleep apnea. Insurance may cover injectables if diagnosed.  - Order sleep study for sleep apnea assessment.    Asthma  Well-controlled.    Follow-up  - Schedule follow-up appointment for July.  - Place on waitlist for earlier appointment if available.    There are no Patient Instructions on file for this visit.    No follow-ups on file.    Patient verbalizes understanding.    Maricel Zarate MD           [1]   Current Outpatient Medications on File Prior to Visit   Medication Sig Dispense Refill    busPIRone 10 MG Oral Tab Take 1 tablet (10 mg total) by mouth 3 (three) times daily. 270 tablet 1    escitalopram 20 MG Oral Tab Take 1 tablet (20 mg total) by mouth daily. 90 tablet 3    pantoprazole 40 MG Oral Tab EC Take 1 tablet (40 mg total) by mouth before breakfast. 90 tablet 3    clonazePAM 1 MG Oral Tab Take 1 tablet (1 mg total) by mouth daily as needed for Anxiety. 10 tablet 0    albuterol (PROAIR HFA) 108 (90 Base) MCG/ACT  Inhalation Aero Soln Inhale 2 puffs into the lungs every 4 (four) hours as needed for Wheezing. 1 each 3     No current facility-administered medications on file prior to visit.

## 2025-03-27 ENCOUNTER — TELEPHONE (OUTPATIENT)
Dept: INTERNAL MEDICINE CLINIC | Facility: CLINIC | Age: 45
End: 2025-03-27

## 2025-03-27 ENCOUNTER — LAB ENCOUNTER (OUTPATIENT)
Dept: LAB | Age: 45
End: 2025-03-27
Attending: INTERNAL MEDICINE
Payer: MEDICAID

## 2025-03-27 ENCOUNTER — OFFICE VISIT (OUTPATIENT)
Dept: INTERNAL MEDICINE CLINIC | Facility: CLINIC | Age: 45
End: 2025-03-27
Payer: MEDICAID

## 2025-03-27 VITALS
WEIGHT: 250 LBS | OXYGEN SATURATION: 100 % | SYSTOLIC BLOOD PRESSURE: 128 MMHG | DIASTOLIC BLOOD PRESSURE: 70 MMHG | BODY MASS INDEX: 41.65 KG/M2 | HEIGHT: 65 IN | RESPIRATION RATE: 20 BRPM | HEART RATE: 98 BPM

## 2025-03-27 DIAGNOSIS — E55.9 VITAMIN D DEFICIENCY: ICD-10-CM

## 2025-03-27 DIAGNOSIS — R06.83 LOUD SNORING: ICD-10-CM

## 2025-03-27 DIAGNOSIS — R06.81 WITNESSED EPISODE OF APNEA: ICD-10-CM

## 2025-03-27 DIAGNOSIS — K21.9 GASTROESOPHAGEAL REFLUX DISEASE WITHOUT ESOPHAGITIS: ICD-10-CM

## 2025-03-27 DIAGNOSIS — R73.03 PREDIABETES: ICD-10-CM

## 2025-03-27 DIAGNOSIS — Z51.81 THERAPEUTIC DRUG MONITORING: Primary | ICD-10-CM

## 2025-03-27 DIAGNOSIS — Z51.81 THERAPEUTIC DRUG MONITORING: ICD-10-CM

## 2025-03-27 LAB
BASOPHILS # BLD AUTO: 0.06 X10(3) UL (ref 0–0.2)
BASOPHILS NFR BLD AUTO: 0.8 %
CHOLEST SERPL-MCNC: 191 MG/DL (ref ?–200)
EOSINOPHIL # BLD AUTO: 0.21 X10(3) UL (ref 0–0.7)
EOSINOPHIL NFR BLD AUTO: 2.6 %
ERYTHROCYTE [DISTWIDTH] IN BLOOD BY AUTOMATED COUNT: 12.6 %
EST. AVERAGE GLUCOSE BLD GHB EST-MCNC: 120 MG/DL (ref 68–126)
FASTING PATIENT LIPID ANSWER: NO
FOLATE SERPL-MCNC: 17.7 NG/ML (ref 5.4–?)
HBA1C MFR BLD: 5.8 % (ref ?–5.7)
HCT VFR BLD AUTO: 39.7 %
HDLC SERPL-MCNC: 51 MG/DL (ref 40–59)
HGB BLD-MCNC: 13 G/DL
IMM GRANULOCYTES # BLD AUTO: 0.07 X10(3) UL (ref 0–1)
IMM GRANULOCYTES NFR BLD: 0.9 %
LDLC SERPL CALC-MCNC: 108 MG/DL (ref ?–100)
LYMPHOCYTES # BLD AUTO: 3.06 X10(3) UL (ref 1–4)
LYMPHOCYTES NFR BLD AUTO: 38.6 %
MCH RBC QN AUTO: 29.1 PG (ref 26–34)
MCHC RBC AUTO-ENTMCNC: 32.7 G/DL (ref 31–37)
MCV RBC AUTO: 88.8 FL
MONOCYTES # BLD AUTO: 0.67 X10(3) UL (ref 0.1–1)
MONOCYTES NFR BLD AUTO: 8.4 %
NEUTROPHILS # BLD AUTO: 3.86 X10 (3) UL (ref 1.5–7.7)
NEUTROPHILS # BLD AUTO: 3.86 X10(3) UL (ref 1.5–7.7)
NEUTROPHILS NFR BLD AUTO: 48.7 %
NONHDLC SERPL-MCNC: 140 MG/DL (ref ?–130)
PLATELET # BLD AUTO: 383 10(3)UL (ref 150–450)
RBC # BLD AUTO: 4.47 X10(6)UL
T4 FREE SERPL-MCNC: 1.1 NG/DL (ref 0.8–1.7)
TRIGL SERPL-MCNC: 183 MG/DL (ref 30–149)
TSI SER-ACNC: 0.92 UIU/ML (ref 0.55–4.78)
VIT B12 SERPL-MCNC: 209 PG/ML (ref 211–911)
VIT D+METAB SERPL-MCNC: 12.6 NG/ML (ref 30–100)
VLDLC SERPL CALC-MCNC: 31 MG/DL (ref 0–30)
WBC # BLD AUTO: 7.9 X10(3) UL (ref 4–11)

## 2025-03-27 PROCEDURE — 82607 VITAMIN B-12: CPT

## 2025-03-27 PROCEDURE — 84443 ASSAY THYROID STIM HORMONE: CPT

## 2025-03-27 PROCEDURE — 36415 COLL VENOUS BLD VENIPUNCTURE: CPT

## 2025-03-27 PROCEDURE — 99214 OFFICE O/P EST MOD 30 MIN: CPT | Performed by: INTERNAL MEDICINE

## 2025-03-27 PROCEDURE — 80061 LIPID PANEL: CPT

## 2025-03-27 PROCEDURE — 82746 ASSAY OF FOLIC ACID SERUM: CPT

## 2025-03-27 PROCEDURE — 83036 HEMOGLOBIN GLYCOSYLATED A1C: CPT

## 2025-03-27 PROCEDURE — 84439 ASSAY OF FREE THYROXINE: CPT

## 2025-03-27 PROCEDURE — 82306 VITAMIN D 25 HYDROXY: CPT

## 2025-03-27 PROCEDURE — 85025 COMPLETE CBC W/AUTO DIFF WBC: CPT

## 2025-03-27 RX ORDER — DIETHYLPROPION HYDROCHLORIDE 75 MG/1
1 TABLET, EXTENDED RELEASE ORAL EVERY MORNING
Qty: 30 TABLET | Refills: 2 | Status: SHIPPED | OUTPATIENT
Start: 2025-03-27 | End: 2025-06-25

## 2025-03-27 NOTE — PROGRESS NOTES
The following individual(s) verbally consented to be recorded using ambient AI listening technology and understand that they can each withdraw their consent to this listening technology at any point by asking the clinician to turn off or pause the recording:    Patient name: Iman Wheatleytte Jama  Additional names:  n/a

## 2025-03-31 DIAGNOSIS — E55.9 VITAMIN D DEFICIENCY: Primary | ICD-10-CM

## 2025-03-31 RX ORDER — ERGOCALCIFEROL 1.25 MG/1
CAPSULE, LIQUID FILLED ORAL
Qty: 24 CAPSULE | Refills: 0 | OUTPATIENT
Start: 2025-03-31

## 2025-03-31 RX ORDER — ERGOCALCIFEROL 1.25 MG/1
50000 CAPSULE, LIQUID FILLED ORAL
Qty: 24 CAPSULE | Refills: 0 | Status: SHIPPED | OUTPATIENT
Start: 2025-03-31

## 2025-04-15 ENCOUNTER — NURSE ONLY (OUTPATIENT)
Dept: INTERNAL MEDICINE CLINIC | Facility: CLINIC | Age: 45
End: 2025-04-15
Payer: MEDICAID

## 2025-04-15 DIAGNOSIS — E53.8 VITAMIN B 12 DEFICIENCY: Primary | ICD-10-CM

## 2025-04-15 PROCEDURE — 96372 THER/PROPH/DIAG INJ SC/IM: CPT | Performed by: INTERNAL MEDICINE

## 2025-04-15 RX ORDER — CYANOCOBALAMIN 1000 UG/ML
1000 INJECTION, SOLUTION INTRAMUSCULAR; SUBCUTANEOUS ONCE
Status: COMPLETED | OUTPATIENT
Start: 2025-04-15 | End: 2025-04-15

## 2025-04-15 RX ADMIN — CYANOCOBALAMIN 1000 MCG: 1000 INJECTION, SOLUTION INTRAMUSCULAR; SUBCUTANEOUS at 15:33:00

## 2025-05-26 ENCOUNTER — APPOINTMENT (OUTPATIENT)
Dept: CT IMAGING | Facility: HOSPITAL | Age: 45
End: 2025-05-26
Attending: EMERGENCY MEDICINE
Payer: MEDICAID

## 2025-05-26 ENCOUNTER — HOSPITAL ENCOUNTER (EMERGENCY)
Facility: HOSPITAL | Age: 45
Discharge: HOME | End: 2025-05-26
Attending: EMERGENCY MEDICINE
Payer: MEDICAID

## 2025-05-26 VITALS
WEIGHT: 240 LBS | SYSTOLIC BLOOD PRESSURE: 128 MMHG | TEMPERATURE: 98 F | RESPIRATION RATE: 18 BRPM | HEIGHT: 65 IN | DIASTOLIC BLOOD PRESSURE: 65 MMHG | HEART RATE: 74 BPM | OXYGEN SATURATION: 98 % | BODY MASS INDEX: 39.99 KG/M2

## 2025-05-26 DIAGNOSIS — R30.0 DYSURIA: Primary | ICD-10-CM

## 2025-05-26 DIAGNOSIS — R10.9 RIGHT FLANK PAIN: ICD-10-CM

## 2025-05-26 LAB
ALBUMIN SERPL-MCNC: 4.9 G/DL (ref 3.2–4.8)
ALBUMIN/GLOB SERPL: 2 {RATIO} (ref 1–2)
ALP LIVER SERPL-CCNC: 74 U/L (ref 37–98)
ALT SERPL-CCNC: 32 U/L (ref 10–49)
ANION GAP SERPL CALC-SCNC: 10 MMOL/L (ref 0–18)
AST SERPL-CCNC: 25 U/L (ref ?–34)
B-HCG UR QL: NEGATIVE
BASOPHILS # BLD AUTO: 0.04 X10(3) UL (ref 0–0.2)
BASOPHILS NFR BLD AUTO: 0.5 %
BILIRUB SERPL-MCNC: 0.2 MG/DL (ref 0.3–1.2)
BILIRUB UR QL STRIP.AUTO: NEGATIVE
BUN BLD-MCNC: 11 MG/DL (ref 9–23)
CALCIUM BLD-MCNC: 9.4 MG/DL (ref 8.7–10.6)
CHLORIDE SERPL-SCNC: 105 MMOL/L (ref 98–112)
CLARITY UR REFRACT.AUTO: CLEAR
CO2 SERPL-SCNC: 26 MMOL/L (ref 21–32)
COLOR UR AUTO: YELLOW
CREAT BLD-MCNC: 0.78 MG/DL (ref 0.55–1.02)
EGFRCR SERPLBLD CKD-EPI 2021: 96 ML/MIN/1.73M2 (ref 60–?)
EOSINOPHIL # BLD AUTO: 0.34 X10(3) UL (ref 0–0.7)
EOSINOPHIL NFR BLD AUTO: 4.4 %
ERYTHROCYTE [DISTWIDTH] IN BLOOD BY AUTOMATED COUNT: 12.3 %
GLOBULIN PLAS-MCNC: 2.5 G/DL (ref 2–3.5)
GLUCOSE BLD-MCNC: 84 MG/DL (ref 70–99)
GLUCOSE UR STRIP.AUTO-MCNC: NORMAL MG/DL
HCT VFR BLD AUTO: 38.2 % (ref 35–48)
HGB BLD-MCNC: 13.1 G/DL (ref 12–16)
IMM GRANULOCYTES # BLD AUTO: 0.03 X10(3) UL (ref 0–1)
IMM GRANULOCYTES NFR BLD: 0.4 %
KETONES UR STRIP.AUTO-MCNC: NEGATIVE MG/DL
LEUKOCYTE ESTERASE UR QL STRIP.AUTO: 25
LIPASE SERPL-CCNC: 41 U/L (ref 12–53)
LYMPHOCYTES # BLD AUTO: 2.27 X10(3) UL (ref 1–4)
LYMPHOCYTES NFR BLD AUTO: 29.1 %
MCH RBC QN AUTO: 29.3 PG (ref 26–34)
MCHC RBC AUTO-ENTMCNC: 34.3 G/DL (ref 31–37)
MCV RBC AUTO: 85.5 FL (ref 80–100)
MONOCYTES # BLD AUTO: 0.64 X10(3) UL (ref 0.1–1)
MONOCYTES NFR BLD AUTO: 8.2 %
NEUTROPHILS # BLD AUTO: 4.47 X10 (3) UL (ref 1.5–7.7)
NEUTROPHILS # BLD AUTO: 4.47 X10(3) UL (ref 1.5–7.7)
NEUTROPHILS NFR BLD AUTO: 57.4 %
NITRITE UR QL STRIP.AUTO: NEGATIVE
OSMOLALITY SERPL CALC.SUM OF ELEC: 291 MOSM/KG (ref 275–295)
PH UR STRIP.AUTO: 6.5 [PH] (ref 5–8)
PLATELET # BLD AUTO: 394 10(3)UL (ref 150–450)
POTASSIUM SERPL-SCNC: 3.7 MMOL/L (ref 3.5–5.1)
PROT SERPL-MCNC: 7.4 G/DL (ref 5.7–8.2)
PROT UR STRIP.AUTO-MCNC: NEGATIVE MG/DL
RBC # BLD AUTO: 4.47 X10(6)UL (ref 3.8–5.3)
RBC UR QL AUTO: NEGATIVE
SODIUM SERPL-SCNC: 141 MMOL/L (ref 136–145)
SP GR UR STRIP.AUTO: >1.03 (ref 1–1.03)
UROBILINOGEN UR STRIP.AUTO-MCNC: NORMAL MG/DL
WBC # BLD AUTO: 7.8 X10(3) UL (ref 4–11)

## 2025-05-26 PROCEDURE — 99284 EMERGENCY DEPT VISIT MOD MDM: CPT

## 2025-05-26 PROCEDURE — 81025 URINE PREGNANCY TEST: CPT

## 2025-05-26 PROCEDURE — 74177 CT ABD & PELVIS W/CONTRAST: CPT | Performed by: EMERGENCY MEDICINE

## 2025-05-26 PROCEDURE — 83690 ASSAY OF LIPASE: CPT | Performed by: EMERGENCY MEDICINE

## 2025-05-26 PROCEDURE — 87086 URINE CULTURE/COLONY COUNT: CPT | Performed by: EMERGENCY MEDICINE

## 2025-05-26 PROCEDURE — 85025 COMPLETE CBC W/AUTO DIFF WBC: CPT | Performed by: EMERGENCY MEDICINE

## 2025-05-26 PROCEDURE — 85025 COMPLETE CBC W/AUTO DIFF WBC: CPT

## 2025-05-26 PROCEDURE — 96375 TX/PRO/DX INJ NEW DRUG ADDON: CPT

## 2025-05-26 PROCEDURE — 96365 THER/PROPH/DIAG IV INF INIT: CPT

## 2025-05-26 PROCEDURE — 80053 COMPREHEN METABOLIC PANEL: CPT | Performed by: EMERGENCY MEDICINE

## 2025-05-26 PROCEDURE — 81001 URINALYSIS AUTO W/SCOPE: CPT | Performed by: EMERGENCY MEDICINE

## 2025-05-26 PROCEDURE — 80053 COMPREHEN METABOLIC PANEL: CPT

## 2025-05-26 RX ORDER — CEFDINIR 300 MG/1
300 CAPSULE ORAL 2 TIMES DAILY
Qty: 14 CAPSULE | Refills: 0 | Status: SHIPPED | OUTPATIENT
Start: 2025-05-26 | End: 2025-06-02

## 2025-05-26 RX ORDER — KETOROLAC TROMETHAMINE 15 MG/ML
15 INJECTION, SOLUTION INTRAMUSCULAR; INTRAVENOUS ONCE
Status: COMPLETED | OUTPATIENT
Start: 2025-05-26 | End: 2025-05-26

## 2025-05-26 NOTE — ED INITIAL ASSESSMENT (HPI)
Pt to ER ambulatory, states for 2-3 days urgency, frequency, odor, pink/red urine, pubic pain/pressure. Bilateral flank pain, worse on rt side. +nausea, -emesis, some diarrhea. Denies fever or chills. Pain 8/10. Hx of stones

## 2025-05-27 NOTE — ED PROVIDER NOTES
Patient Seen in: [facility] Department        History  Chief Complaint   Patient presents with    Urinary Symptoms     Stated Complaint: UTI symptoms    Subjective:   HPI            45-year-old female presents today for evaluation.  Yesterday, patient began having a right lower back pain that radiates into her abdomen.  She noted some hematuria and dysuria.  She reports nausea without any vomiting.  She has not had any fevers or chills.      Objective:     Past Medical History:    Anxiety state    ASCUS of cervix with negative high risk HPV    ASCUS of cervix with negative high risk HPV    Asthma (HCC)    VERONICA I (cervical intraepithelial neoplasia I)    Depression    HPV in female    Kidney stone    Psychiatric disorder    Tendonitis of ankle    Visual impairment              Past Surgical History:   Procedure Laterality Date    Colposcopy,bx cervix/endocerv curr  2020    NL     Colposcopy,bx cervix/endocerv curr  2018    LGSIL     Colposcopy,bx cervix/endocerv curr  2021    VERONICA 1    Hysterectomy      partial hysterectomy    Other      pins to both hips    Other  10/2012    ESSURE    Other surgical history  2013    excision bartholin cyst,revision of perineal scar    Path report - endocervical biopsy (g1b.1)  2010    Gyne dom Merrill                Social History     Socioeconomic History    Marital status: Single   Tobacco Use    Smoking status: Former     Current packs/day: 0.00     Average packs/day: 0.5 packs/day for 25.0 years (12.5 ttl pk-yrs)     Types: Cigarettes     Start date: 1998     Quit date: 2023     Years since quittin.0    Smokeless tobacco: Never   Vaping Use    Vaping status: Former   Substance and Sexual Activity    Alcohol use: Yes     Comment: minimal    Drug use: No    Sexual activity: Not Currently     Partners: Male     Comment: essure                                Physical Exam    ED Triage Vitals [25 1856]   BP (!) 150/93    Pulse 87   Resp 18   Temp 98.2 °F (36.8 °C)   Temp src Temporal   SpO2 100 %   O2 Device None (Room air)       Current Vitals:   Vital Signs  BP: 128/65  Pulse: 74  Resp: 18  Temp: 98.2 °F (36.8 °C)  Temp src: Temporal  MAP (mmHg): 90    Oxygen Therapy  SpO2: 98 %  O2 Device: None (Room air)            Physical Exam  Vitals and nursing note reviewed.   Constitutional:       Appearance: Normal appearance.   HENT:      Head: Normocephalic.      Nose: Nose normal.      Mouth/Throat:      Mouth: Mucous membranes are moist.   Eyes:      Extraocular Movements: Extraocular movements intact.   Cardiovascular:      Rate and Rhythm: Normal rate.   Pulmonary:      Effort: Pulmonary effort is normal.   Abdominal:      General: Abdomen is flat.      Tenderness: There is no abdominal tenderness. There is no guarding or rebound.   Musculoskeletal:         General: Normal range of motion.   Skin:     General: Skin is warm.   Neurological:      General: No focal deficit present.      Mental Status: She is alert.   Psychiatric:         Mood and Affect: Mood normal.             ED Course  Labs Reviewed   COMP METABOLIC PANEL (14) - Abnormal; Notable for the following components:       Result Value    Bilirubin, Total 0.2 (*)     Albumin 4.9 (*)     All other components within normal limits   URINALYSIS WITH CULTURE REFLEX - Abnormal; Notable for the following components:    Spec Gravity >1.030 (*)     Leukocyte Esterase Urine 25 (*)     Squamous Epi. Cells Few (*)     All other components within normal limits   LIPASE - Normal   POCT PREGNANCY URINE - Normal   CBC WITH DIFFERENTIAL WITH PLATELET   RAINBOW DRAW LAVENDER   RAINBOW DRAW LIGHT GREEN   URINE CULTURE, ROUTINE          CT ABDOMEN+PELVIS(CONTRAST ONLY)(CPT=74177)  Result Date: 5/26/2025  PROCEDURE:  CT ABDOMEN+PELVIS (CONTRAST ONLY) (CPT=74177)  COMPARISON:  [facility], CT, CT ABDOMEN+PELVIS KIDNEYSTONE 2D RNDR(NO IV,NO ORAL)(CPT=74176), 11/11/2021, 8:02 PM.  INDICATIONS:   UTI symptoms  TECHNIQUE:  CT scanning was performed from the dome of the diaphragm to the pubic symphysis with non-ionic intravenous contrast material. Post contrast coronal MPR imaging was performed.  Dose reduction techniques were used. Dose information is transmitted to the ACR (American College of Radiology) NRDR (National Radiology Data Registry) which includes the Dose Index Registry.  PATIENT STATED HISTORY:(As transcribed by Technologist)  Patient states for 2-3 days urgency, frequency, odor, pink/red urine, pubic pain/pressure. Bilateral flank pain, worse on rt side. +nausea, -emesis, some diarrhea.   CONTRAST USED:  100cc of Isovue 370  FINDINGS:  LIVER:  Normal morphology with mild steatosis. BILIARY:  No visible dilatation or calcification.  PANCREAS:  No lesion, fluid collection, ductal dilatation, or atrophy.  SPLEEN:  No enlargement or focal lesion.  KIDNEYS:  No mass, obstruction, or calcification.  ADRENALS:  No mass or enlargement.  AORTA/VASCULAR:  No aneurysm or dissection.  RETROPERITONEUM:  No mass or adenopathy.  BOWEL/MESENTERY:  No visible mass, obstruction, or bowel wall thickening.  ABDOMINAL WALL:  No mass or hernia.  URINARY BLADDER:  No visible focal wall thickening, lesion, or calculus.  PELVIC NODES:  No adenopathy.  PELVIC ORGANS:  Hysterectomy. BONES:  Serpiginous sclerosis of bilateral femoral heads most in keeping with chronic AVN.  No subchondral fracture or femoral head collapse.  Similar findings were present in 2021. LUNG BASES:  No visible pulmonary or pleural disease.  OTHER:  Negative.             CONCLUSION:   1. No acute process identified within the abdomen or pelvis.  2. Mild hepatic steatosis.  3. Chronic AVN of bilateral femoral heads without subchondral fracture or femoral head collapse.   LOCATION:  [facility]   Dictated by (CST): Edin العراقي MD on 5/26/2025 at 9:15 PM     Finalized by (CST): Edin العراقي MD on 5/26/2025 at 9:19 PM                         MDM      Differential Diagnosis  45-year-old female presents today for evaluation of right flank pain radiating to her right lower abdomen.  Her abdomen is soft without guarding or rigidity.  Differential includes pyelonephritis, diverticulitis, appendicitis, ureterolithiasis, cholelithiasis, cholecystitis.  Plan for CT along with labs.    9:51 pm  Imaging does not demonstrate acute abnormality.  Patient was not noted to have leukocytosis.  On reassessment, patient remains well-appearing.  Urinalysis demonstrates 25 leuk esterase with only 1-5 WBC and no bacteria.  The urine looks fairly benign, although I did discuss with patient the possibility of false negative urine.  With her presenting symptoms, I feel be best to empirically treat patient for UTI.  She is agreeable to that plan.  I advised PCP follow-up for reassessment and return for any worsening symptoms.  Patient is comfortable with the plan, ashley LEÓN.    I informed patient of the fatty liver that should be monitored through her PCP    I informed patient of the avascular necrosis.  She had hip surgery as a child and has chronic hip pain at baseline.  I advised that she follow-up with orthopedics for further recommendations.        Medical Decision Making      Disposition and Plan     Clinical Impression:  1. Dysuria    2. Right flank pain         Disposition:  Discharge  5/26/2025  9:53 pm    Follow-up:  Irving Greenwood MD  [address] [place] [id number]  [phone]    Call in 1 day(s)      [facility], [address] [place] [id number] [phone]  Call in 1 day(s)            Medications Prescribed:  Discharge Medication List as of 5/26/2025 10:51 PM        START taking these medications    Details   cefdinir 300 MG Oral Cap Take 1 capsule (300 mg total) by mouth 2 (two) times daily for 7 days., Normal, Disp-14 capsule, R-0                   Supplementary Documentation:

## 2025-05-27 NOTE — DISCHARGE INSTRUCTIONS
Take the antibiotic as prescribed.  Follow-up with your primary care doctor in the next 3 to 5 days for reassessment.  Return for any worsening pain or fevers.    Follow-up with an orthopedic doctor within the next several weeks to establish for care of your hips.

## 2025-07-01 NOTE — PROGRESS NOTES
HISTORY OF PRESENT ILLNESS  Chief Complaint   Patient presents with    Weight Check     Down 3        Iman Yun is a 45 year old female here for follow up in medical weight loss program.   [facility] Follow Up    General Information  Success Moment: None unfortunately  Challenging Moment: Losing weight  Nutrition Recall  Breakfast: 1/2 steak mushroom cheese skillet Lunch: Salad   Dinner: chicken noodles broccoli Snacks: Nuts or fruit or rice cakes   Fluids: Propel coffee Dining Out: 1   Exercise   Patient stated exercises # days/week: 4  Patient stated perceived level of   exertion: 2 Anaerobic Days: 3   Aerobic Days: 2   Patient stated average level of stress: 5  Sleep   Patient stated # hours uninterrupted sleep: 2   Patient stated feels   restful: No      Cause of disruption of sleep: I don't sleep enough   Goals: Weight loss and sleep              History of Present Illness  Iman Yun is a 45 year old female who presents with weight management concerns.    She has significant difficulty with weight management, having discontinued phentermine on October 16. She is currently using diethylpropion but finds it ineffective, stating 'this literally did nothing.' She is uncomfortable with her current weight, describing it as 'the worst feeling' and has difficulty with functional movements such as bending over and tying shoes. Her ankle tendonitis has worsened due to the extra weight.    She had a recent hospital visit on Memorial Day for a urinary tract infection and underwent a CT scan that revealed discolored hips. 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 During the hospital visit, concern was expressed about the condition of her hips, which has caused her significant concern.    She has been diagnosed as pre-diabetic based on recent blood work. She is frustrated with her current health insurance situation, which she feels limits her access to  affordable weight loss medications.    She reports poor sleep quality, which she believes contributes to her weight issues. She has not completed a recent sleep test due to insurance issues.    Wt Readings from Last 6 Encounters:   07/02/25 247 lb (112 kg)   05/26/25 240 lb (108.9 kg)   03/27/25 250 lb (113.4 kg)   05/21/24 231 lb (104.8 kg)   01/25/24 241 lb (109.3 kg)   09/20/23 220 lb (99.8 kg)              Breakfast Lunch Dinner Snacks Fluids   Reviewed           REVIEW OF SYSTEMS  GENERAL HEALTH: feels well otherwise, denied any fevers chills or night sweats   RESPIRATORY: denies shortness of breath   CARDIOVASCULAR: denies chest pain  GI: denies abdominal pain    EXAM  /78   Pulse 87   Resp 18   Ht 5' 5\" (1.651 m)   Wt 247 lb (112 kg)   LMP 07/08/2022   BMI 41.10 kg/m²   GENERAL: well developed, well nourished,in no apparent distress, A/O x3  SKIN: no rashes,no suspicious lesions  HEENT: atraumatic, normocephalic, OP-clear, PERRL  NECK: supple,no adenopathy  LUNGS: clear to auscultation bilaterally   CARDIO: RRR without murmur  GI: good BS's,NT/ND, no masses or HSM  EXTREMITIES: no cyanosis, no clubbing, no edema    Lab Results   Component Value Date    WBC 7.8 05/26/2025    RBC 4.47 05/26/2025    HGB 13.1 05/26/2025    HCT 38.2 05/26/2025    MCV 85.5 05/26/2025    MCH 29.3 05/26/2025    MCHC 34.3 05/26/2025    RDW 12.3 05/26/2025    .0 05/26/2025    MPV 9.1 (L) 12/18/2012     Lab Results   Component Value Date    GLU 84 05/26/2025    BUN 11 05/26/2025    BUNCREA NOT APPLICABLE 03/09/2022    CREATSERUM 0.78 05/26/2025    ANIONGAP 10 05/26/2025     09/01/2017    GFRNAA 109 03/09/2022    GFRAA 126 03/09/2022    CA 9.4 05/26/2025    OSMOCALC 291 05/26/2025    ALKPHO 74 05/26/2025    AST 25 05/26/2025    ALT 32 05/26/2025    BILT 0.2 (L) 05/26/2025    TP 7.4 05/26/2025    ALB 4.9 (H) 05/26/2025    GLOBULIN 2.5 05/26/2025    AGRATIO 1.6 03/09/2022     05/26/2025    K 3.7 05/26/2025      05/26/2025    CO2 26.0 05/26/2025     Lab Results   Component Value Date     03/27/2025    A1C 5.8 (H) 03/27/2025     Lab Results   Component Value Date    CHOLEST 191 03/27/2025    TRIG 183 (H) 03/27/2025    HDL 51 03/27/2025     (H) 03/27/2025    VLDL 31 (H) 03/27/2025    NONHDLC 140 (H) 03/27/2025     Lab Results   Component Value Date    T4F 1.1 03/27/2025    TSH 0.923 03/27/2025    TSHT4 1.22 03/09/2022     Lab Results   Component Value Date    B12 209 (L) 03/27/2025     Lab Results   Component Value Date    VITD 12.6 (L) 03/27/2025       Medications Ordered Prior to Encounter[1]    ASSESSMENT  Analyzed weight data:     [facility] Information  Patient type: Non-Surgical   Initial Body Fat %: 44.9      Date of Initial Weight: 1/25/24 Initial Weight: 241     Initial BMI: 39.8         Have any comorbidities improved since the last visit?   Hypertension DM 2 Dyslipidemia Osteoarthritis Sleep Apnea                    Diagnoses and all orders for this visit:    Therapeutic drug monitoring  -     Glucose Tolerance 2 HR, 2 sp, (75 g, non-gestational, ACOG); Future    BMI 40.0-44.9, adult (HCC)  -     Glucose Tolerance 2 HR, 2 sp, (75 g, non-gestational, ACOG); Future    Prediabetes  -     Glucose Tolerance 2 HR, 2 sp, (75 g, non-gestational, ACOG); Future    Vitamin B 12 deficiency  -     Glucose Tolerance 2 HR, 2 sp, (75 g, non-gestational, ACOG); Future    Vitamin D deficiency  -     Glucose Tolerance 2 HR, 2 sp, (75 g, non-gestational, ACOG); Future    Avascular necrosis of bone of hip, right (HCC)  -     Glucose Tolerance 2 HR, 2 sp, (75 g, non-gestational, ACOG); Future    Avascular necrosis of bone of left hip (HCC)  -     Glucose Tolerance 2 HR, 2 sp, (75 g, non-gestational, ACOG); Future        PLAN  Assessment & Plan  Obesity  Obesity causing discomfort and functional limitations. Previous medications ineffective. Insurance barriers to accessing [facility]. Weight loss crucial to prevent hip  complications.  - Schedule dietitian appointment.  - Explore insurance options for medication coverage.  - Consider assistance programs for diabetes drugs.    Hip Issues  CT scan showed AVN with risk of shattering, necessitating weight loss.    Sleep Apnea  Sleep apnea worsening sleep quality, complicating weight management. Insurance issues delaying evaluation.  - Consider home sleep test.    Pre-diabetes  Blood work shows pre-diabetes, affecting weight management and health.  Consider addition of metformin    Tendonitis  Ankle tendonitis worsening due to excess weight, increasing discomfort and limitations.    Urinary Tract Infection  UTI resolved after hospital visit.    There are no Patient Instructions on file for this visit.    No follow-ups on file.    Patient verbalizes understanding.    Maricel Zarate MD           [1]   Current Outpatient Medications on File Prior to Visit   Medication Sig Dispense Refill    ergocalciferol 1.25 MG (52302 UT) Oral Cap Take 1 capsule (50,000 Units total) by mouth twice a week. For 12 weeks.  Once complete, switch to over-the-counter vitamin D of 2000 units once daily.  Please take with food. 24 capsule 0    busPIRone 10 MG Oral Tab Take 1 tablet (10 mg total) by mouth 3 (three) times daily. 270 tablet 1    escitalopram 20 MG Oral Tab Take 1 tablet (20 mg total) by mouth daily. 90 tablet 3    pantoprazole 40 MG Oral Tab EC Take 1 tablet (40 mg total) by mouth before breakfast. 90 tablet 3    clonazePAM 1 MG Oral Tab Take 1 tablet (1 mg total) by mouth daily as needed for Anxiety. 10 tablet 0    albuterol (PROAIR HFA) 108 (90 Base) MCG/ACT Inhalation Aero Soln Inhale 2 puffs into the lungs every 4 (four) hours as needed for Wheezing. 1 each 3     No current facility-administered medications on file prior to visit.

## 2025-07-02 ENCOUNTER — OFFICE VISIT (OUTPATIENT)
Dept: INTERNAL MEDICINE CLINIC | Facility: CLINIC | Age: 45
End: 2025-07-02
Payer: MEDICAID

## 2025-07-02 VITALS
SYSTOLIC BLOOD PRESSURE: 120 MMHG | WEIGHT: 247 LBS | RESPIRATION RATE: 18 BRPM | DIASTOLIC BLOOD PRESSURE: 78 MMHG | HEIGHT: 65 IN | BODY MASS INDEX: 41.15 KG/M2 | HEART RATE: 87 BPM

## 2025-07-02 DIAGNOSIS — E53.8 VITAMIN B 12 DEFICIENCY: ICD-10-CM

## 2025-07-02 DIAGNOSIS — E55.9 VITAMIN D DEFICIENCY: ICD-10-CM

## 2025-07-02 DIAGNOSIS — Z51.81 THERAPEUTIC DRUG MONITORING: Primary | ICD-10-CM

## 2025-07-02 DIAGNOSIS — R73.03 PREDIABETES: ICD-10-CM

## 2025-07-02 DIAGNOSIS — M87.051 AVASCULAR NECROSIS OF BONE OF HIP, RIGHT (HCC): ICD-10-CM

## 2025-07-02 DIAGNOSIS — M87.052 AVASCULAR NECROSIS OF BONE OF LEFT HIP (HCC): ICD-10-CM

## 2025-07-02 PROCEDURE — 99214 OFFICE O/P EST MOD 30 MIN: CPT | Performed by: INTERNAL MEDICINE

## 2025-07-05 ENCOUNTER — LABORATORY ENCOUNTER (OUTPATIENT)
Dept: LAB | Age: 45
End: 2025-07-05
Attending: INTERNAL MEDICINE
Payer: MEDICAID

## 2025-07-05 DIAGNOSIS — Z51.81 THERAPEUTIC DRUG MONITORING: ICD-10-CM

## 2025-07-05 DIAGNOSIS — M87.052 AVASCULAR NECROSIS OF BONE OF LEFT HIP (HCC): ICD-10-CM

## 2025-07-05 DIAGNOSIS — R73.03 PREDIABETES: ICD-10-CM

## 2025-07-05 DIAGNOSIS — E55.9 VITAMIN D DEFICIENCY: ICD-10-CM

## 2025-07-05 DIAGNOSIS — M87.051 AVASCULAR NECROSIS OF BONE OF HIP, RIGHT (HCC): ICD-10-CM

## 2025-07-05 DIAGNOSIS — E53.8 VITAMIN B 12 DEFICIENCY: ICD-10-CM

## 2025-07-05 LAB
GLUCOSE 1H P GLC SERPL-MCNC: 97 MG/DL (ref 70–139)
GLUCOSE P FAST SERPL-MCNC: 113 MG/DL (ref 70–99)

## 2025-07-05 PROCEDURE — 82947 ASSAY GLUCOSE BLOOD QUANT: CPT

## 2025-07-05 PROCEDURE — 36415 COLL VENOUS BLD VENIPUNCTURE: CPT

## 2025-07-05 PROCEDURE — 82950 GLUCOSE TEST: CPT

## 2025-07-25 NOTE — TELEPHONE ENCOUNTER
For replies, please route to pool: [facility] CENTRAL REFILLS    Please review: medication fails/has no protocol attached.  No future appointments with primary care medicine     Patient has not had an in-person office visit since 7/20/2023 - last 3 appointments have been telemedicine.    Last office visit: 1/30/25 (telemedicine)

## 2025-07-26 RX ORDER — ESCITALOPRAM OXALATE 20 MG/1
20 TABLET ORAL DAILY
Qty: 90 TABLET | Refills: 0 | Status: SHIPPED | OUTPATIENT
Start: 2025-07-26

## (undated) DEVICE — SUT MONOCRYL 3-0 PS-2 Y427H

## (undated) DEVICE — SUT VICRYL 0 J912G

## (undated) DEVICE — PKS LYONS DISSECTING FORCEPS 5MM/33CM: Brand: PK TECHNOLOGY

## (undated) DEVICE — PANTS KNIT WASHABLE 2/3XL

## (undated) DEVICE — SUT MONOCRYL 4-0 PS-2 Y496G

## (undated) DEVICE — ABDOMINAL PAD: Brand: DERMACEA

## (undated) DEVICE — SUT VICRYL 0 CTX J370H

## (undated) DEVICE — SPONGE STICK WITH PVP-I: Brand: KENDALL

## (undated) DEVICE — SUT PLAIN GUT 2-0 CT 853H

## (undated) DEVICE — PLUMEPORT ACTIV LAPAROSCOPIC SMOKE FILTRATION DEVICE: Brand: PLUMEPORT ACTIVE

## (undated) DEVICE — INSUFFLATION NEEDLE TO ESTABLISH PNEUMOPERITONEUM.: Brand: INSUFFLATION NEEDLE

## (undated) DEVICE — SUT VICRYL 0 CT-1 J260H

## (undated) DEVICE — STERILE SYNTHETIC POLYISOPRENE POWDER-FREE SURGICAL GLOVES WITH HYDROGEL COATING: Brand: PROTEXIS

## (undated) DEVICE — TROCAR: Brand: KII® SLEEVE

## (undated) DEVICE — STERILE POLYISOPRENE POWDER-FREE SURGICAL GLOVES: Brand: PROTEXIS

## (undated) DEVICE — 40580 - THE PINK PAD - ADVANCED TRENDELENBURG POSITIONING KIT: Brand: 40580 - THE PINK PAD - ADVANCED TRENDELENBURG POSITIONING KIT

## (undated) DEVICE — ADHESIVE MASTISOL 2/3ML

## (undated) DEVICE — CLOSURE EXOFIN 1.0ML

## (undated) DEVICE — 2, DISPOSABLE SUCTION/IRRIGATOR WITHOUT DISPOSABLE TIP: Brand: STRYKEFLOW

## (undated) DEVICE — HARMONIC 1100 SHEARS, 36CM SHAFT LENGTH: Brand: HARMONIC

## (undated) DEVICE — PENCIL TELESCOPE MEGADYNE SE

## (undated) DEVICE — DELINEATOR UTER MANIP 3.5

## (undated) DEVICE — NON-ADHERENT PAD PREPACK: Brand: TELFA

## (undated) DEVICE — SUT VICRYL 0 CT-1 JJ31G

## (undated) DEVICE — PAD SANITARY 10" STERILE

## (undated) DEVICE — SLEEVE KENDALL SCD EXPRESS MED

## (undated) DEVICE — LIGHT HANDLE

## (undated) DEVICE — VIOLET BRAIDED (POLYGLACTIN 910), SYNTHETIC ABSORBABLE SUTURE: Brand: COATED VICRYL

## (undated) DEVICE — GYN LAP/ROBOTIC: Brand: MEDLINE INDUSTRIES, INC.

## (undated) DEVICE — SOLUTION  .9 1000ML BTL

## (undated) DEVICE — [HIGH FLOW INSUFFLATOR,  DO NOT USE IF PACKAGE IS DAMAGED,  KEEP DRY,  KEEP AWAY FROM SUNLIGHT,  PROTECT FROM HEAT AND RADIOACTIVE SOURCES.]: Brand: PNEUMOSURE

## (undated) DEVICE — 3M™ STERI-STRIP™ REINFORCED ADHESIVE SKIN CLOSURES, R1547, 1/2 IN X 4 IN (12 MM X 100 MM), 6 STRIPS/ENVELOPE: Brand: 3M™ STERI-STRIP™

## (undated) NOTE — LETTER
ASTHMA ACTION PLAN for Mackenzie Guillory     : 1980     Date: 2023  Provider:  Caden Alvarenga MD  Phone for doctor or clinic: [facility], [address] [facility], [place] [id number] [phone]    ACT Score: 21      You can use the colors of a traffic light to help learn about your asthma medicines. 1. Green - Go! % of Personal Best Peak Flow Use controller medicine. Breathing is good  No cough or wheeze  Can work and play Medicine How much to take When to take it    Avoid triggers      2. Yellow - Caution. 50-79% Personal Best Peak  Flow. Use reliever medicine to keep an asthma attack from getting bad. Cough  Wheezing  Tight Chest  Wake up at night Medicine How much to take When to take it    albuterol 108 (90 Base) MCG/ACT Inhalation Aero Soln-  Inhale 2 puffs into the lungs every 4 (four) hours as needed for Wheezing        Additional instructions         3. Red - Stop! Danger!  <50% Personal Best Peak  Flow. Take these medications until  Get help from a doctor   Medicine not helping  Breathing is hard and fast  Nose opens wide  Can't walk  Ribs show  Can't talk well Medicine How much to take When to take it    Go to the nearest Emergency Room/Department right now! Additional Instructions If your symptoms do not improve and you cannot contact your doctor, [facility] or call 911 immediately! [x] Asthma Action Plan reviewed with patient (and caregiver if necessary) and a copy of the plan was given to the patient/caregiver. [] Asthma Action Plan reviewed with patient (and caregiver if necessary) on the phone and mailed copy to patient or submitted via [address]      Signatures:  Provider  Caden Alvarenga MD   Patient Caretaker

## (undated) NOTE — LETTER
Date: 2/24/2020    Patient Name: Shantel Blas          To Whom it may concern: This letter has been written at the patient's request. The above patient was seen at the [facility] for treatment of a medical condition.     This patient

## (undated) NOTE — LETTER
February 22, 2018    Patient: Ha Dumont   Date of Visit: 2/22/2018       To Whom It May Concern:    Chiki Frost was seen and treated in our emergency department on 2/22/2018. She should not return to work until 2/24/18.     If you have any

## (undated) NOTE — ED AVS SNAPSHOT
Art Stokes   MRN: [id number]    Department:  [facility] Emergency Department   Date of Visit:  10/5/2019           Disclosure     Insurance plans vary and the physician(s) referred by the ER may not be covered by your plan.  Please contact tell this physician (or your personal doctor if your instructions are to return to your personal doctor) about any new or lasting problems. The primary care or specialist physician will see patients referred from the [facility] Emergency Department.  Renetta Carmona

## (undated) NOTE — LETTER
Date: 10/26/2021    Patient Name: Rosina Paris          To Whom it may concern: This letter has been written at the patient's request. The above patient was seen at the [facility] for treatment of a medical condition.     The pat

## (undated) NOTE — ED AVS SNAPSHOT
[facility] Emergency Department    [place]  [place] [address]    Phone:  [phone]    Fax:  [phone]   MRN: [id number]    Department:  [facility] Emergency Department   Date of Visit:  6 What changed: This medication is already on your medication list.  Do NOT take both doses of the new and old medication. ONLY take the dose prescribed today. * Notice:   This list has 2 medication(s) that are the same as other medications prescribed a substitute for ongoing medical care. Often, one Emergency Department visit does not uncover every injury or illness.  If you have been referred to a primary care or a specialist physician for a follow-up visit, please tell this physician (or your personal Sonu Keyes (Ethridge Schwab) [phone] [address] ([address]) [phone]                Additional Information       We are concerned for your overall well being:    - If you are a smoker or have smoked in the las If you have questions, you can call [phone] to talk to our [facility] Staff. Remember, [employer] is NOT to be used for urgent needs. For medical emergencies, dial 911. Visit [email or web address] to learn more.

## (undated) NOTE — ED AVS SNAPSHOT
[facility] Emergency Department    [place] [id number]    Phone:  [phone]    Fax:  [phone]   MRN: [id number]    Department:  [facility] Emergency Department   Date of Visit:  6 IF THERE IS ANY CHANGE OR WORSENING OF YOUR CONDITION, CALL YOUR PRIMARY CARE PHYSICIAN AT ONCE OR RETURN IMMEDIATELY TO THE EMERGENCY DEPARTMENT.     If you have been prescribed any medication(s), please fill your prescription right away and begin taking t

## (undated) NOTE — ED AVS SNAPSHOT
Shantel Wan   MRN: [id number]    Department:  [facility] Emergency Department   Date of Visit:  9/1/2017           Disclosure     Insurance plans vary and the physician(s) referred by the ER may not be covered by your plan.  Please contact y If you have been prescribed any medication(s), please fill your prescription right away and begin taking the medication(s) as directed    If the emergency physician has read X-rays, these will be re-interpreted by a radiologist.  If there is a significant

## (undated) NOTE — LETTER
Date: 8/23/2021    Patient Name: Jordana Han          To Whom it may concern: This letter has been written at the patient's request. The above patient was seen at the [facility] for treatment of a medical condition.     This pat

## (undated) NOTE — ED AVS SNAPSHOT
Pallavi Thomas   MRN: [id number]    Department:  [facility] Emergency Department in [place]   Date of Visit:  2/22/2018           Disclosure     Insurance plans vary and the physician(s) referred by the ER may not be covered by your plan.  Please con tell this physician (or your personal doctor if your instructions are to return to your personal doctor) about any new or lasting problems. The primary care or specialist physician will see patients referred from the [facility] Emergency Department.  Fabi Kaur

## (undated) NOTE — ED AVS SNAPSHOT
[facility] Emergency Department  [place] [address]  Phone:  [phone]  Fax:  [facility] [address]   MRN: [id number]    Department:  [facility] Emergency Department   Date of Visit:  7/8/2017 IF THERE IS ANY CHANGE OR WORSENING OF YOUR CONDITION, CALL YOUR PRIMARY CARE PHYSICIAN AT ONCE OR RETURN IMMEDIATELY TO THE EMERGENCY DEPARTMENT.     If you have been prescribed any medication(s), please fill your prescription right away and begin taking t

## (undated) NOTE — LETTER
09/03/21        Liu Yun  [address] [address]      Dear Leann Schrader,    [facility] records indicate that you have outstanding lab work and or testing that was ordered for you and has not yet been completed:  Orders Placed Th

## (undated) NOTE — LETTER
Date & Time: 11/12/2020, 4:36 PM  Patient: [place] [facility]  Encounter Provider(s):    Patricia Roberts       To Whom It May Concern:    Shawnee Mcleod was seen and treated in our department on 11/12/2020.  Patient should not return to work at th

## (undated) NOTE — LETTER
Date & Time: 10/5/2019, 4:23 PM  Patient: Ya Mccoy  Encounter Provider(s):    Chris Wiseman MD       To Whom It May Concern:    Thelma Larson was seen and treated in our department on 10/5/2019.  She should not return to work until 10/8/20

## (undated) NOTE — ED AVS SNAPSHOT
Ya Sampson   MRN: [id number]    Department:  [facility] Emergency Department   Date of Visit:  2/2/2019           Disclosure     Insurance plans vary and the physician(s) referred by the ER may not be covered by your plan.  Please contact y tell this physician (or your personal doctor if your instructions are to return to your personal doctor) about any new or lasting problems. The primary care or specialist physician will see patients referred from the [facility] Emergency Department.  Fabi Kaur

## (undated) NOTE — LETTER
Date & Time: 10/31/2020, 4:14 PM  Patient: [facility]  Encounter Provider(s):    GLYNN Stanton       To Whom It May Concern:    Ira Miranda was seen and treated in our department on 10/31/2020.  She should not return to w

## (undated) NOTE — ED AVS SNAPSHOT
Shantel Wan   MRN: [id number]    Department:  [facility] Emergency Department   Date of Visit:  8/29/2017           Disclosure     Insurance plans vary and the physician(s) referred by the ER may not be covered by your plan.  Please contact If you have been prescribed any medication(s), please fill your prescription right away and begin taking the medication(s) as directed    If the emergency physician has read X-rays, these will be re-interpreted by a radiologist.  If there is a significant

## (undated) NOTE — ED AVS SNAPSHOT
Brittni Lopez   MRN: [id number]    Department:  [facility] Emergency Department in [place]   Date of Visit:  8/29/2018           Disclosure     Insurance plans vary and the physician(s) referred by the ER may not be covered by your plan.  Please con tell this physician (or your personal doctor if your instructions are to return to your personal doctor) about any new or lasting problems. The primary care or specialist physician will see patients referred from the [facility] Emergency Department.  Cecil Recinos